# Patient Record
Sex: MALE | Race: AMERICAN INDIAN OR ALASKA NATIVE | NOT HISPANIC OR LATINO | URBAN - METROPOLITAN AREA
[De-identification: names, ages, dates, MRNs, and addresses within clinical notes are randomized per-mention and may not be internally consistent; named-entity substitution may affect disease eponyms.]

---

## 2017-11-27 ENCOUNTER — OUTPATIENT (OUTPATIENT)
Dept: OUTPATIENT SERVICES | Facility: HOSPITAL | Age: 57
LOS: 1 days | Discharge: HOME | End: 2017-11-27
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
## 2017-11-27 DIAGNOSIS — E78.5 HYPERLIPIDEMIA, UNSPECIFIED: ICD-10-CM

## 2017-11-27 DIAGNOSIS — R07.9 CHEST PAIN, UNSPECIFIED: ICD-10-CM

## 2017-11-27 DIAGNOSIS — E11.9 TYPE 2 DIABETES MELLITUS WITHOUT COMPLICATIONS: ICD-10-CM

## 2017-11-30 DIAGNOSIS — K57.30 DIVERTICULOSIS OF LARGE INTESTINE WITHOUT PERFORATION OR ABSCESS WITHOUT BLEEDING: ICD-10-CM

## 2017-11-30 DIAGNOSIS — Z12.11 ENCOUNTER FOR SCREENING FOR MALIGNANT NEOPLASM OF COLON: ICD-10-CM

## 2017-11-30 DIAGNOSIS — E11.9 TYPE 2 DIABETES MELLITUS WITHOUT COMPLICATIONS: ICD-10-CM

## 2017-11-30 DIAGNOSIS — E78.00 PURE HYPERCHOLESTEROLEMIA, UNSPECIFIED: ICD-10-CM

## 2017-11-30 DIAGNOSIS — K22.2 ESOPHAGEAL OBSTRUCTION: ICD-10-CM

## 2017-11-30 DIAGNOSIS — K29.50 UNSPECIFIED CHRONIC GASTRITIS WITHOUT BLEEDING: ICD-10-CM

## 2017-11-30 DIAGNOSIS — I10 ESSENTIAL (PRIMARY) HYPERTENSION: ICD-10-CM

## 2017-11-30 DIAGNOSIS — K44.9 DIAPHRAGMATIC HERNIA WITHOUT OBSTRUCTION OR GANGRENE: ICD-10-CM

## 2017-11-30 DIAGNOSIS — K64.8 OTHER HEMORRHOIDS: ICD-10-CM

## 2018-06-07 ENCOUNTER — EMERGENCY (EMERGENCY)
Facility: HOSPITAL | Age: 58
LOS: 0 days | Discharge: HOME | End: 2018-06-08
Attending: EMERGENCY MEDICINE | Admitting: EMERGENCY MEDICINE

## 2018-06-07 VITALS
RESPIRATION RATE: 18 BRPM | SYSTOLIC BLOOD PRESSURE: 148 MMHG | OXYGEN SATURATION: 97 % | TEMPERATURE: 98 F | HEART RATE: 81 BPM | DIASTOLIC BLOOD PRESSURE: 92 MMHG
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
## 2018-06-07 DIAGNOSIS — Z79.84 LONG TERM (CURRENT) USE OF ORAL HYPOGLYCEMIC DRUGS: ICD-10-CM

## 2018-06-07 DIAGNOSIS — Z79.82 LONG TERM (CURRENT) USE OF ASPIRIN: ICD-10-CM

## 2018-06-07 DIAGNOSIS — K21.9 GASTRO-ESOPHAGEAL REFLUX DISEASE WITHOUT ESOPHAGITIS: ICD-10-CM

## 2018-06-07 DIAGNOSIS — E78.5 HYPERLIPIDEMIA, UNSPECIFIED: ICD-10-CM
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
## 2018-06-07 DIAGNOSIS — R10.9 UNSPECIFIED ABDOMINAL PAIN: ICD-10-CM

## 2018-06-07 DIAGNOSIS — S39.012A STRAIN OF MUSCLE, FASCIA AND TENDON OF LOWER BACK, INITIAL ENCOUNTER: ICD-10-CM
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
## 2018-06-07 DIAGNOSIS — Z79.899 OTHER LONG TERM (CURRENT) DRUG THERAPY: ICD-10-CM

## 2018-06-07 DIAGNOSIS — I10 ESSENTIAL (PRIMARY) HYPERTENSION: ICD-10-CM

## 2018-06-08 RX ORDER — ASPIRIN/CALCIUM CARB/MAGNESIUM 324 MG
1 TABLET ORAL
Qty: 0 | Refills: 0 | COMMUNITY

## 2018-06-08 NOTE — ED PROVIDER NOTE - OBJECTIVE STATEMENT
pt co left low back pain after working in the yard yest. sore. worse with movement of leg/low back. no numbness or weakness. some rad down to thigh.  no ab pain, n, v, fever. no urinary sx.  no incont. no saddle anesth.

## 2018-06-08 NOTE — ED PROVIDER NOTE - PHYSICAL EXAMINATION
VITAL SIGNS: I have reviewed nursing notes and confirm.  CONSTITUTIONAL: Well-developed; well-nourished; in no acute distress.  SKIN: Skin exam is warm and dry, no acute rash.  HEAD: Normocephalic; atraumatic.  EYES: PERRL, EOM intact; conjunctiva and sclera clear.  ENT: No nasal discharge; airway clear.   NECK: Supple; non tender.  CARD:+ S1, S2   RESP: No wheezes, rales or rhonchi.  ABD: Normal bowel sounds; soft; non-distended; non-tender;  BACK: tender over left lumbar spine.   EXT: Normal ROM. No cyanosis or edema.  LYMPH: No acute adenopathy.  NEURO: Alert. Grossly unremarkable. No focal deficits.  PSYCH: Cooperative, appropriate.

## 2018-06-08 NOTE — ED ADULT NURSE NOTE - OBJECTIVE STATEMENT
Pt presents with low back pain that started after doing yardwork. Pt states he was sent by [facility] [place] abnormal [place] ray. Pt denies chest pain, sob, chills, n/v, fever.

## 2018-06-08 NOTE — ED ADULT NURSE NOTE - CHPI ED SYMPTOMS NEG
no difficulty bearing weight/no fatigue/no neck tenderness/no bowel dysfunction/no constipation/no numbness/no bladder dysfunction/no anorexia/no motor function loss/no tingling

## 2020-10-15 ENCOUNTER — INPATIENT (INPATIENT)
Facility: HOSPITAL | Age: 60
LOS: 1 days | Discharge: HOME | End: 2020-10-17
Attending: INTERNAL MEDICINE | Admitting: INTERNAL MEDICINE
Payer: COMMERCIAL

## 2020-10-15 VITALS
WEIGHT: 190.04 LBS | RESPIRATION RATE: 16 BRPM | TEMPERATURE: 99 F | OXYGEN SATURATION: 99 % | SYSTOLIC BLOOD PRESSURE: 167 MMHG | HEIGHT: 68 IN | HEART RATE: 86 BPM | DIASTOLIC BLOOD PRESSURE: 80 MMHG

## 2020-10-15 DIAGNOSIS — Z98.890 OTHER SPECIFIED POSTPROCEDURAL STATES: Chronic | ICD-10-CM

## 2020-10-15 LAB
ALBUMIN SERPL ELPH-MCNC: 4.1 G/DL — SIGNIFICANT CHANGE UP (ref 3.5–5.2)
ALP SERPL-CCNC: 73 U/L — SIGNIFICANT CHANGE UP (ref 30–115)
ALT FLD-CCNC: 19 U/L — SIGNIFICANT CHANGE UP (ref 0–41)
ANION GAP SERPL CALC-SCNC: 10 MMOL/L — SIGNIFICANT CHANGE UP (ref 7–14)
APTT BLD: 34 SEC — SIGNIFICANT CHANGE UP (ref 27–39.2)
AST SERPL-CCNC: 18 U/L — SIGNIFICANT CHANGE UP (ref 0–41)
BILIRUB DIRECT SERPL-MCNC: <0.2 MG/DL — SIGNIFICANT CHANGE UP (ref 0–0.2)
BILIRUB INDIRECT FLD-MCNC: >0.3 MG/DL — SIGNIFICANT CHANGE UP (ref 0.2–1.2)
BILIRUB SERPL-MCNC: 0.5 MG/DL — SIGNIFICANT CHANGE UP (ref 0.2–1.2)
BLD GP AB SCN SERPL QL: SIGNIFICANT CHANGE UP
BUN SERPL-MCNC: 15 MG/DL — SIGNIFICANT CHANGE UP (ref 10–20)
CALCIUM SERPL-MCNC: 9.5 MG/DL — SIGNIFICANT CHANGE UP (ref 8.5–10.1)
CHLORIDE SERPL-SCNC: 100 MMOL/L — SIGNIFICANT CHANGE UP (ref 98–110)
CHOLEST SERPL-MCNC: 157 MG/DL — SIGNIFICANT CHANGE UP (ref 100–200)
CO2 SERPL-SCNC: 27 MMOL/L — SIGNIFICANT CHANGE UP (ref 17–32)
CREAT SERPL-MCNC: 0.9 MG/DL — SIGNIFICANT CHANGE UP (ref 0.7–1.5)
GLUCOSE SERPL-MCNC: 167 MG/DL — HIGH (ref 70–99)
HDLC SERPL-MCNC: 43 MG/DL — SIGNIFICANT CHANGE UP
INR BLD: 1.08 RATIO — SIGNIFICANT CHANGE UP (ref 0.65–1.3)
LACTATE SERPL-SCNC: 1.5 MMOL/L — SIGNIFICANT CHANGE UP (ref 0.7–2)
LIDOCAIN IGE QN: 560 U/L — HIGH (ref 7–60)
LIPID PNL WITH DIRECT LDL SERPL: 89 MG/DL — SIGNIFICANT CHANGE UP (ref 4–129)
POTASSIUM SERPL-MCNC: 5.1 MMOL/L — HIGH (ref 3.5–5)
POTASSIUM SERPL-SCNC: 5.1 MMOL/L — HIGH (ref 3.5–5)
PROT SERPL-MCNC: 6.8 G/DL — SIGNIFICANT CHANGE UP (ref 6–8)
PROTHROM AB SERPL-ACNC: 12.4 SEC — SIGNIFICANT CHANGE UP (ref 9.95–12.87)
RAPID RVP RESULT: SIGNIFICANT CHANGE UP
SARS-COV-2 RNA SPEC QL NAA+PROBE: SIGNIFICANT CHANGE UP
SODIUM SERPL-SCNC: 137 MMOL/L — SIGNIFICANT CHANGE UP (ref 135–146)
TOTAL CHOLESTEROL/HDL RATIO MEASUREMENT: 3.7 RATIO — LOW (ref 4–5.5)
TRIGL SERPL-MCNC: 140 MG/DL — SIGNIFICANT CHANGE UP (ref 10–149)

## 2020-10-15 PROCEDURE — 99285 EMERGENCY DEPT VISIT HI MDM: CPT

## 2020-10-15 PROCEDURE — 74177 CT ABD & PELVIS W/CONTRAST: CPT | Mod: 26

## 2020-10-15 PROCEDURE — 99222 1ST HOSP IP/OBS MODERATE 55: CPT

## 2020-10-15 RX ORDER — SODIUM CHLORIDE 9 MG/ML
1000 INJECTION, SOLUTION INTRAVENOUS ONCE
Refills: 0 | Status: COMPLETED | OUTPATIENT
Start: 2020-10-15 | End: 2020-10-15

## 2020-10-15 RX ORDER — FAMOTIDINE 10 MG/ML
20 INJECTION INTRAVENOUS ONCE
Refills: 0 | Status: COMPLETED | OUTPATIENT
Start: 2020-10-15 | End: 2020-10-15

## 2020-10-15 RX ORDER — ACETAMINOPHEN 500 MG
650 TABLET ORAL ONCE
Refills: 0 | Status: COMPLETED | OUTPATIENT
Start: 2020-10-15 | End: 2020-10-15

## 2020-10-15 RX ADMIN — Medication 650 MILLIGRAM(S): at 18:16

## 2020-10-15 RX ADMIN — SODIUM CHLORIDE 1000 MILLILITER(S): 9 INJECTION, SOLUTION INTRAVENOUS at 20:18

## 2020-10-15 RX ADMIN — FAMOTIDINE 20 MILLIGRAM(S): 10 INJECTION INTRAVENOUS at 18:58

## 2020-10-15 RX ADMIN — SODIUM CHLORIDE 1000 MILLILITER(S): 9 INJECTION, SOLUTION INTRAVENOUS at 18:16

## 2020-10-15 NOTE — ED ADULT TRIAGE NOTE - CHIEF COMPLAINT QUOTE
pt states he has had left abdominal pain for 5 days with 2 dark tarry stools today. pt states it hurts more after eating or breathing in.

## 2020-10-15 NOTE — H&P ADULT - NSICDXPASTMEDICALHX_GEN_ALL_CORE_FT
PAST MEDICAL HISTORY:  DM (diabetes mellitus)     GERD (gastroesophageal reflux disease)     HLD (hyperlipidemia)     HTN (hypertension)

## 2020-10-15 NOTE — ED PROVIDER NOTE - CLINICAL SUMMARY MEDICAL DECISION MAKING FREE TEXT BOX
60 male here for evaluation of abdominal pain. Had screening labs imaging medications and reevaluation, plan is for inpatient admission for continued management for pancreatitis of unknown source.

## 2020-10-15 NOTE — ED PROVIDER NOTE - PHYSICAL EXAMINATION
CONSTITUTIONAL: Well-developed; well-nourished; in no acute distress.   SKIN: warm, dry  HEAD: Normocephalic; atraumatic.  EYES: no conjunctival injection. PERRLA. EOMI.   ENT: No nasal discharge; airway clear.  NECK: Supple; non tender.  CARD: S1, S2 normal; no murmurs, gallops, or rubs. Regular rate and rhythm.   RESP: No wheezes, rales or rhonchi.  ABD: soft nondistended. +LLQ TTP. no rebound or guarding.   EXT: Normal ROM.  No LE edema.   LYMPH: No acute cervical adenopathy.  NEURO: Alert, oriented, grossly unremarkable.  PSYCH: Cooperative, appropriate. CONSTITUTIONAL: Well-developed; well-nourished; in no acute distress.   SKIN: warm, dry  HEAD: Normocephalic; atraumatic.  EYES: no conjunctival injection. PERRLA. EOMI.   ENT: No nasal discharge; airway clear.  NECK: Supple; non tender.  CARD: S1, S2 normal; no murmurs, gallops, or rubs. Regular rate and rhythm.   RESP: No wheezes, rales or rhonchi.  ABD: soft nondistended. +LLQ TTP. no rebound or guarding.   RECTAL: Nontender, no masses or fissures, no blood. chaperoned by ADY Dawson.    EXT: Normal ROM.  No LE edema.   LYMPH: No acute cervical adenopathy.  NEURO: Alert, oriented, grossly unremarkable.  PSYCH: Cooperative, appropriate.

## 2020-10-15 NOTE — ED ADULT NURSE NOTE - PMH
DM (diabetes mellitus)    GERD (gastroesophageal reflux disease)    HLD (hyperlipidemia)    HTN (hypertension)

## 2020-10-15 NOTE — ED PROVIDER NOTE - OBJECTIVE STATEMENT
61 y/o M PMHx DM, GERD, HLD, HTN, CAD s/p CABG on aspirin and plavix presents to ED with intermittent moderate LLQ pain x4 days. Pt reports one dark black bowel movement today prompting ED visit. No fevers or chills. No urinary sx. Pt had normal colonoscopy 3 years ago. Denies vomiting or nausea.

## 2020-10-15 NOTE — ED PROVIDER NOTE - NS ED ROS FT
Review of Systems:  CONSTITUTIONAL: No fever, No diaphoresis, No weight change  SKIN: No rash  HEMATOLOGIC: No abnormal bleeding or bruising  EYES: No eye pain, No blurred vision  ENT: No sore throat, No neck pain, No rhinorrhea  RESPIRATORY: No shortness of breath, No cough  CARDIAC: No chest pain, No palpitations  GI: +abdominal pain, No nausea, No vomiting, No diarrhea, No constipation, No bright red blood per rectum. +dark stools. No flank pain  : No dysuria, frequency, hematuria.   MUSCULOSKELETAL: No joint paint, No swelling, No back pain  NEUROLOGIC: No numbness, No focal weakness, No headache, No dizziness  All other systems negative, unless specified in HPI

## 2020-10-15 NOTE — H&P ADULT - HISTORY OF PRESENT ILLNESS
[age] male with multiple medical conditions including heart disease and diabetes, presents to the ER due to abdominal pains which are worse with eating. 59yo male with multiple medical conditions including heart disease and diabetes, presents to the ER due to abdominal pains which are worse with eating. Location is left upper abdomen

## 2020-10-15 NOTE — H&P ADULT - NSHPLABSRESULTS_GEN_ALL_CORE
< from: CT Abdomen and Pelvis w/ IV Cont (10.15.20 @ 19:42) >      EXAM:  CT ABDOMEN AND PELVIS IC            PROCEDURE DATE:  10/15/2020      < from: CT Abdomen and Pelvis w/ IV Cont (10.15.20 @ 19:42) >      IMPRESSION:    1. No evidence of diverticulitis.    2. Small nonspecific (1 cm or less) lymph nodes are noted within the mesentery.    3. A 7 mm ill-defined nodule is noted in the left posterior costophrenic sulcus (2/15)(4/57)    [employer] RECOMMENDATIONS FOR FOLLOW-UP OF SMALL LUNG NODULES DETECTED INCIDENTALLY ON CT (PATIENTS ? 35 YEARS OF AGE)  NODULE SIZE >6 <8 mm    LOW-RISK PATIENT ? Initial CT at 6-12 months ? If stable, repeat CT at 18-24 months  HIGH-RISK PATIENT ? Initial CT at 3-6 months ? If stable, repeat CT at 9-12 months and 24 months      MARISELA DIAS M.D., ATTENDING RADIOLOGIST  This document has been electronically signed. Oct 15 2020  8:41PM    < end of copied text >                          13.2   8.24  )-----------( 233      ( 15 Oct 2020 18:48 )             38.9     10-15    137  |  100  |  15  ----------------------------<  167<H>  5.1<H>   |  27  |  0.9    Ca    9.5      15 Oct 2020 18:15    TPro  6.8  /  Alb  4.1  /  TBili  0.5  /  DBili  <0.2  /  AST  18  /  ALT  19  /  AlkPhos  73  10-15            PT/INR - ( 15 Oct 2020 18:15 )   PT: 12.40 sec;   INR: 1.08 ratio         PTT - ( 15 Oct 2020 18:15 )  PTT:34.0 sec  Lactate Trend  10-15 @ 18:15 Lactate:1.5         CAPILLARY BLOOD GLUCOSE      POCT Blood Glucose.: 174 mg/dL (16 Oct 2020 02:51)                          13.2   8.24  )-----------( 233      ( 15 Oct 2020 18:48 )             38.9     10-15    137  |  100  |  15  ----------------------------<  167<H>  5.1<H>   |  27  |  0.9    Ca    9.5      15 Oct 2020 18:15    TPro  6.8  /  Alb  4.1  /  TBili  0.5  /  DBili  <0.2  /  AST  18  /  ALT  19  /  AlkPhos  73  10-15            PT/INR - ( 15 Oct 2020 18:15 )   PT: 12.40 sec;   INR: 1.08 ratio         PTT - ( 15 Oct 2020 18:15 )  PTT:34.0 sec  Lactate Trend  10-15 @ 18:15 Lactate:1.5     Lipase-    CAPILLARY BLOOD GLUCOSE      POCT Blood Glucose.: 174 mg/dL (16 Oct 2020 02:51)

## 2020-10-15 NOTE — ED PROVIDER NOTE - ATTENDING CONTRIBUTION TO CARE
I personally evaluated the patient. I reviewed the Resident’s or Physician Assistant’s note (as assigned above), and agree with the findings and plan except as documented in my note.     60 male here for evaluation of abdominal pain left sided with dark stool today. LLQ pain is non radiating, for multiple days, without constitutional symptoms.     ROS otherwise unremarkable    PE: female in no distress. CV: pulses intact. CHEST: normal work of breathing. ABD: nondistended. left sided tenderness to palpation noted no rebound no guarding. SKIN: normal. EXT: FROM. NEURO: AAO 3 no focal deficits. HEENT: mucosa normal nonicteric    Impression: abdominal pain     Plan: IV labs imaging supportive care and reevaluation I personally evaluated the patient. I reviewed the Resident’s or Physician Assistant’s note (as assigned above), and agree with the findings and plan except as documented in my note.     60 male here for evaluation of abdominal pain left sided with dark stool today. LLQ pain is non radiating, for multiple days, without constitutional symptoms.     ROS otherwise unremarkable    PE: male in no distress. CV: pulses intact. CHEST: normal work of breathing. ABD: nondistended. left sided tenderness to palpation noted no rebound no guarding. SKIN: normal. EXT: FROM. NEURO: AAO 3 no focal deficits. HEENT: mucosa normal nonicteric    Impression: abdominal pain     Plan: IV labs imaging supportive care and reevaluation

## 2020-10-16 DIAGNOSIS — E78.5 HYPERLIPIDEMIA, UNSPECIFIED: ICD-10-CM

## 2020-10-16 DIAGNOSIS — E11.9 TYPE 2 DIABETES MELLITUS WITHOUT COMPLICATIONS: ICD-10-CM

## 2020-10-16 DIAGNOSIS — K85.90 ACUTE PANCREATITIS WITHOUT NECROSIS OR INFECTION, UNSPECIFIED: ICD-10-CM

## 2020-10-16 DIAGNOSIS — I10 ESSENTIAL (PRIMARY) HYPERTENSION: ICD-10-CM

## 2020-10-16 DIAGNOSIS — K21.9 GASTRO-ESOPHAGEAL REFLUX DISEASE WITHOUT ESOPHAGITIS: ICD-10-CM

## 2020-10-16 DIAGNOSIS — R91.1 SOLITARY PULMONARY NODULE: ICD-10-CM

## 2020-10-16 LAB
ALBUMIN SERPL ELPH-MCNC: 3.7 G/DL — SIGNIFICANT CHANGE UP (ref 3.5–5.2)
ALP SERPL-CCNC: 69 U/L — SIGNIFICANT CHANGE UP (ref 30–115)
ALT FLD-CCNC: 16 U/L — SIGNIFICANT CHANGE UP (ref 0–41)
ANION GAP SERPL CALC-SCNC: 10 MMOL/L — SIGNIFICANT CHANGE UP (ref 7–14)
AST SERPL-CCNC: 15 U/L — SIGNIFICANT CHANGE UP (ref 0–41)
BILIRUB SERPL-MCNC: 0.7 MG/DL — SIGNIFICANT CHANGE UP (ref 0.2–1.2)
BUN SERPL-MCNC: 9 MG/DL — LOW (ref 10–20)
CALCIUM SERPL-MCNC: 9.1 MG/DL — SIGNIFICANT CHANGE UP (ref 8.5–10.1)
CHLORIDE SERPL-SCNC: 101 MMOL/L — SIGNIFICANT CHANGE UP (ref 98–110)
CO2 SERPL-SCNC: 27 MMOL/L — SIGNIFICANT CHANGE UP (ref 17–32)
CREAT SERPL-MCNC: 0.7 MG/DL — SIGNIFICANT CHANGE UP (ref 0.7–1.5)
GLUCOSE BLDC GLUCOMTR-MCNC: 154 MG/DL — HIGH (ref 70–99)
GLUCOSE BLDC GLUCOMTR-MCNC: 155 MG/DL — HIGH (ref 70–99)
GLUCOSE BLDC GLUCOMTR-MCNC: 174 MG/DL — HIGH (ref 70–99)
GLUCOSE BLDC GLUCOMTR-MCNC: 183 MG/DL — HIGH (ref 70–99)
GLUCOSE BLDC GLUCOMTR-MCNC: 251 MG/DL — HIGH (ref 70–99)
GLUCOSE SERPL-MCNC: 202 MG/DL — HIGH (ref 70–99)
HCT VFR BLD CALC: 40.3 % — LOW (ref 42–52)
HCV AB S/CO SERPL IA: 0.04 COI — SIGNIFICANT CHANGE UP
HCV AB SERPL-IMP: SIGNIFICANT CHANGE UP
HGB BLD-MCNC: 14 G/DL — SIGNIFICANT CHANGE UP (ref 14–18)
LIDOCAIN IGE QN: 259 U/L — HIGH (ref 7–60)
MAGNESIUM SERPL-MCNC: 1.6 MG/DL — LOW (ref 1.8–2.4)
MCHC RBC-ENTMCNC: 29.6 PG — SIGNIFICANT CHANGE UP (ref 27–31)
MCHC RBC-ENTMCNC: 34.7 G/DL — SIGNIFICANT CHANGE UP (ref 32–37)
MCV RBC AUTO: 85.2 FL — SIGNIFICANT CHANGE UP (ref 80–94)
NRBC # BLD: 0 /100 WBCS — SIGNIFICANT CHANGE UP (ref 0–0)
PHOSPHATE SERPL-MCNC: 3.4 MG/DL — SIGNIFICANT CHANGE UP (ref 2.1–4.9)
PLATELET # BLD AUTO: 232 K/UL — SIGNIFICANT CHANGE UP (ref 130–400)
POTASSIUM SERPL-MCNC: 4.3 MMOL/L — SIGNIFICANT CHANGE UP (ref 3.5–5)
POTASSIUM SERPL-SCNC: 4.3 MMOL/L — SIGNIFICANT CHANGE UP (ref 3.5–5)
PROT SERPL-MCNC: 6.2 G/DL — SIGNIFICANT CHANGE UP (ref 6–8)
RBC # BLD: 4.73 M/UL — SIGNIFICANT CHANGE UP (ref 4.7–6.1)
RBC # FLD: 11.9 % — SIGNIFICANT CHANGE UP (ref 11.5–14.5)
SODIUM SERPL-SCNC: 138 MMOL/L — SIGNIFICANT CHANGE UP (ref 135–146)
WBC # BLD: 7.02 K/UL — SIGNIFICANT CHANGE UP (ref 4.8–10.8)
WBC # FLD AUTO: 7.02 K/UL — SIGNIFICANT CHANGE UP (ref 4.8–10.8)

## 2020-10-16 PROCEDURE — 99233 SBSQ HOSP IP/OBS HIGH 50: CPT

## 2020-10-16 PROCEDURE — 99222 1ST HOSP IP/OBS MODERATE 55: CPT

## 2020-10-16 RX ORDER — DEXTROSE 50 % IN WATER 50 %
12.5 SYRINGE (ML) INTRAVENOUS ONCE
Refills: 0 | Status: DISCONTINUED | OUTPATIENT
Start: 2020-10-16 | End: 2020-10-17

## 2020-10-16 RX ORDER — INSULIN LISPRO 100/ML
VIAL (ML) SUBCUTANEOUS
Refills: 0 | Status: DISCONTINUED | OUTPATIENT
Start: 2020-10-16 | End: 2020-10-17

## 2020-10-16 RX ORDER — SODIUM CHLORIDE 9 MG/ML
1000 INJECTION INTRAMUSCULAR; INTRAVENOUS; SUBCUTANEOUS
Refills: 0 | Status: DISCONTINUED | OUTPATIENT
Start: 2020-10-16 | End: 2020-10-16

## 2020-10-16 RX ORDER — DEXTROSE 50 % IN WATER 50 %
15 SYRINGE (ML) INTRAVENOUS ONCE
Refills: 0 | Status: DISCONTINUED | OUTPATIENT
Start: 2020-10-16 | End: 2020-10-17

## 2020-10-16 RX ORDER — GLUCAGON INJECTION, SOLUTION 0.5 MG/.1ML
1 INJECTION, SOLUTION SUBCUTANEOUS ONCE
Refills: 0 | Status: DISCONTINUED | OUTPATIENT
Start: 2020-10-16 | End: 2020-10-17

## 2020-10-16 RX ORDER — INSULIN LISPRO 100/ML
4 VIAL (ML) SUBCUTANEOUS ONCE
Refills: 0 | Status: COMPLETED | OUTPATIENT
Start: 2020-10-16 | End: 2020-10-16

## 2020-10-16 RX ORDER — LOSARTAN POTASSIUM 100 MG/1
25 TABLET, FILM COATED ORAL DAILY
Refills: 0 | Status: DISCONTINUED | OUTPATIENT
Start: 2020-10-16 | End: 2020-10-17

## 2020-10-16 RX ORDER — ASPIRIN/CALCIUM CARB/MAGNESIUM 324 MG
81 TABLET ORAL DAILY
Refills: 0 | Status: DISCONTINUED | OUTPATIENT
Start: 2020-10-16 | End: 2020-10-17

## 2020-10-16 RX ORDER — DEXTROSE 50 % IN WATER 50 %
25 SYRINGE (ML) INTRAVENOUS ONCE
Refills: 0 | Status: DISCONTINUED | OUTPATIENT
Start: 2020-10-16 | End: 2020-10-17

## 2020-10-16 RX ORDER — SODIUM CHLORIDE 9 MG/ML
1000 INJECTION, SOLUTION INTRAVENOUS
Refills: 0 | Status: DISCONTINUED | OUTPATIENT
Start: 2020-10-16 | End: 2020-10-17

## 2020-10-16 RX ORDER — SODIUM CHLORIDE 9 MG/ML
1000 INJECTION INTRAMUSCULAR; INTRAVENOUS; SUBCUTANEOUS
Refills: 0 | Status: DISCONTINUED | OUTPATIENT
Start: 2020-10-16 | End: 2020-10-17

## 2020-10-16 RX ORDER — ATORVASTATIN CALCIUM 80 MG/1
10 TABLET, FILM COATED ORAL AT BEDTIME
Refills: 0 | Status: DISCONTINUED | OUTPATIENT
Start: 2020-10-16 | End: 2020-10-17

## 2020-10-16 RX ORDER — PANTOPRAZOLE SODIUM 20 MG/1
40 TABLET, DELAYED RELEASE ORAL
Refills: 0 | Status: DISCONTINUED | OUTPATIENT
Start: 2020-10-16 | End: 2020-10-17

## 2020-10-16 RX ORDER — ACETAMINOPHEN 500 MG
650 TABLET ORAL EVERY 6 HOURS
Refills: 0 | Status: DISCONTINUED | OUTPATIENT
Start: 2020-10-16 | End: 2020-10-17

## 2020-10-16 RX ORDER — IBUPROFEN 200 MG
400 TABLET ORAL EVERY 6 HOURS
Refills: 0 | Status: DISCONTINUED | OUTPATIENT
Start: 2020-10-16 | End: 2020-10-17

## 2020-10-16 RX ORDER — MAGNESIUM SULFATE 500 MG/ML
2 VIAL (ML) INJECTION ONCE
Refills: 0 | Status: COMPLETED | OUTPATIENT
Start: 2020-10-16 | End: 2020-10-16

## 2020-10-16 RX ADMIN — Medication 1: at 17:08

## 2020-10-16 RX ADMIN — PANTOPRAZOLE SODIUM 40 MILLIGRAM(S): 20 TABLET, DELAYED RELEASE ORAL at 05:44

## 2020-10-16 RX ADMIN — Medication 650 MILLIGRAM(S): at 05:43

## 2020-10-16 RX ADMIN — LOSARTAN POTASSIUM 25 MILLIGRAM(S): 100 TABLET, FILM COATED ORAL at 05:44

## 2020-10-16 RX ADMIN — SODIUM CHLORIDE 250 MILLILITER(S): 9 INJECTION INTRAMUSCULAR; INTRAVENOUS; SUBCUTANEOUS at 04:29

## 2020-10-16 RX ADMIN — Medication 4 UNIT(S): at 12:07

## 2020-10-16 RX ADMIN — ATORVASTATIN CALCIUM 10 MILLIGRAM(S): 80 TABLET, FILM COATED ORAL at 22:06

## 2020-10-16 RX ADMIN — SODIUM CHLORIDE 250 MILLILITER(S): 9 INJECTION INTRAMUSCULAR; INTRAVENOUS; SUBCUTANEOUS at 11:29

## 2020-10-16 RX ADMIN — Medication 81 MILLIGRAM(S): at 11:31

## 2020-10-16 RX ADMIN — Medication 650 MILLIGRAM(S): at 06:24

## 2020-10-16 RX ADMIN — Medication 50 GRAM(S): at 17:08

## 2020-10-16 RX ADMIN — SODIUM CHLORIDE 250 MILLILITER(S): 9 INJECTION INTRAMUSCULAR; INTRAVENOUS; SUBCUTANEOUS at 22:07

## 2020-10-16 RX ADMIN — SODIUM CHLORIDE 250 MILLILITER(S): 9 INJECTION INTRAMUSCULAR; INTRAVENOUS; SUBCUTANEOUS at 15:07

## 2020-10-16 NOTE — CONSULT NOTE ADULT - SUBJECTIVE AND OBJECTIVE BOX
Gastroenterology Consultation:    Patient is a 60y old  Male who presents with a chief complaint of pancreatitis (16 Oct 2020 15:29)      Admitted on: 10-15-20  HPI:  61yo male with multiple medical conditions including heart disease and diabetes, presents to the ER due to abdominal pains which are worse with eating. Location is left upper abdomen (15 Oct 2020 23:48)    GI HPI  60 year old male with CAD and DM presents with moderate LLQ abdominal pain that is worse with eating. The pain is intermittent. No prior similar symptoms. Today he states that he is somewhat improved but he attributes this to a limited, clear liquid diet. No diarrhea. He denies nausea or vomiting.     Prior records Reviewed (Y/N): Y  History obtained from person other than patient (Y/N): Y Son      Prior Colonoscopy: 4years ago. Results unknown      PAST MEDICAL & SURGICAL HISTORY:  GERD (gastroesophageal reflux disease)    DM (diabetes mellitus)    HTN (hypertension)    HLD (hyperlipidemia)    H/O heart surgery        FAMILY HISTORY:  Noncontributory    Social History:  no IVDU      Home Medications:  aspirin 81 mg oral tablet: 1 tab(s) orally once a day (08 Jun 2018 03:31)  atorvastatin:  (08 Jun 2018 03:31)  glimepiride:  (08 Jun 2018 03:31)  losartan:  (08 Jun 2018 03:31)  metFORMIN:  (08 Jun 2018 03:31)  omeprazole:  (08 Jun 2018 03:31)    MEDICATIONS  (STANDING):  aspirin enteric coated 81 milliGRAM(s) Oral daily  atorvastatin 10 milliGRAM(s) Oral at bedtime  dextrose 5%. 1000 milliLiter(s) (50 mL/Hr) IV Continuous <Continuous>  dextrose 50% Injectable 12.5 Gram(s) IV Push once  dextrose 50% Injectable 25 Gram(s) IV Push once  insulin lispro (HumaLOG) corrective regimen sliding scale   SubCutaneous three times a day before meals  losartan 25 milliGRAM(s) Oral daily  pantoprazole    Tablet 40 milliGRAM(s) Oral before breakfast  sodium chloride 0.9%. 1000 milliLiter(s) (250 mL/Hr) IV Continuous <Continuous>  sodium chloride 0.9%. 1000 milliLiter(s) (250 mL/Hr) IV Continuous <Continuous>    MEDICATIONS  (PRN):  acetaminophen   Tablet .. 650 milliGRAM(s) Oral every 6 hours PRN Mild Pain (1 - 3)  dextrose 40% Gel 15 Gram(s) Oral once PRN Blood Glucose LESS THAN 70 milliGRAM(s)/deciliter  glucagon  Injectable 1 milliGRAM(s) IntraMuscular once PRN Glucose LESS THAN 70 milligrams/deciliter  ibuprofen  Tablet. 400 milliGRAM(s) Oral every 6 hours PRN Moderate Pain (4 - 6)      Allergies  No Known Allergies      Review of Systems:   Constitutional:  No Fever, No Chills  ENT/Mouth:  No Hearing Changes,  No Difficulty Swallowing  Eyes:  No Eye Pain, No Vision Changes  Cardiovascular:  No Chest Pain, No Palpitations  Respiratory:  No Cough, No Dyspnea  Gastrointestinal:  As described in HPI  Musculoskeletal:  No Joint Swelling, No Back Pain  Skin:  No Skin Lesions, No Jaundice  Neuro:  No Syncope, No Dizziness  Heme/Lymph:  No Bruising, No Bleeding.          Physical Examination:  T(C): 36.8 (10-16-20 @ 13:00), Max: 37.4 (10-15-20 @ 17:01)  HR: 63 (10-16-20 @ 13:00) (63 - 86)  BP: 175/84 (10-16-20 @ 13:00) (152/72 - 175/84)  RR: 16 (10-16-20 @ 13:00) (16 - 17)  SpO2: 99% (10-15-20 @ 23:36) (99% - 99%)  Height (cm): 165.1 (10-16-20 @ 02:50)  Weight (kg): 91.8 (10-16-20 @ 02:50)      Constitutional: No acute distress.  Eyes:. Conjunctivae are clear, Sclera is non-icteric.  Ears Nose and Throat: The external ears are normal appearing,  Oral mucosa is pink and moist.  Respiratory:  No signs of respiratory distress. Lung sounds are clear bilaterally.  Cardiovascular:  S1 S2, Regular rate and rhythm.  GI: Abdomen is soft, symmetric, and non-tender without distention. There are no visible lesions. Bowel sounds are present and normoactive in all four quadrants. No masses, hepatomegaly, or splenomegaly are noted.   Neuro: No Tremor, No involuntary movements  Skin: No rashes, No Jaundice.          Data: (reviewed by attending)                        14.0   7.02  )-----------( 232      ( 16 Oct 2020 06:55 )             40.3     Hgb Trend:  14.0  10-16-20 @ 06:55  13.2  10-15-20 @ 18:48      10-16    138  |  101  |  9<L>  ----------------------------<  202<H>  4.3   |  27  |  0.7    Ca    9.1      16 Oct 2020 06:55  Phos  3.4     10-16  Mg     1.6     10-16    TPro  6.2  /  Alb  3.7  /  TBili  0.7  /  DBili  x   /  AST  15  /  ALT  16  /  AlkPhos  69  10-16    Liver panel trend:  TBili 0.7   /   AST 15   /   ALT 16   /   AlkP 69   /   Tptn 6.2   /   Alb 3.7    /   DBili --      10-16  TBili 0.5   /   AST 18   /   ALT 19   /   AlkP 73   /   Tptn 6.8   /   Alb 4.1    /   DBili <0.2      10-15      PT/INR - ( 15 Oct 2020 18:15 )   PT: 12.40 sec;   INR: 1.08 ratio         PTT - ( 15 Oct 2020 18:15 )  PTT:34.0 sec        Radiology:(reviewed by attending)  CT Abdomen and Pelvis w/ IV Cont:   EXAM:  CT ABDOMEN AND PELVIS IC            PROCEDURE DATE:  10/15/2020            INTERPRETATION:  REASON FOR EXAM / CLINICAL STATEMENT: LLQ abdominal pain  WBC 8.24    TECHNIQUE: Contiguous axial CT images were obtained from the lower chest to thepubic symphysis with intravenous contrast.   Reformatted images in the coronal and sagittal planes were acquired.    COMPARISON CT: CT scan of the abdomen and pelvis dated 6/8/2018    OTHER STUDIES USED FOR CORRELATION: None.        FINDINGS    LOWERCHEST: A 7 mm ill-defined nodule is noted in the left posterior costophrenic sulcus (2/15). Status post CABG. No pleural or pericardial effusion.    HEPATIC: The liver is normal in appearance with no evidence of mass or bile duct dilatation. The portal vein is patent. The hepatic veins are opacified.    BILIARY: No calcified gallstones are noted.    SPLEEN: Unremarkable.    PANCREAS: The pancreas is normal in size and configuration. No evidence of mass or pancreatitis.    ADRENAL GLANDS: Unremarkable.    KIDNEYS: There is symmetric bilateral renal enhancement. No evidence of hydronephrosis, calcified stones, or solid mass.    ABDOMINOPELVIC NODES: Small nonspecific (1 cm or less) lymph nodes are noted within the mesentery.    PELVIC ORGANS: No evidence of pelvic mass, lymphadenopathy, or fluid collection.    PERITONEUM/MESENTERY/BOWEL: Chronic diverticula are noted however there is no evidence of diverticulitis. No evidence of bowel obstruction, colitis, inflammatory process, or ascites within the abdomen or pelvis. No pneumoperitoneum. The appendix is normal in appearance. A fat-containing nonobstructing umbilical hernia is noted.    BONES/SOFT TISSUES: Degenerative changes of the spine are noted.    OTHER: No evidence of abdominal aortic aneurysm.      IMPRESSION:    1. No evidence of diverticulitis.    2. Small nonspecific (1 cm or less) lymph nodes are noted within the mesentery.    3. A 7 mm ill-defined nodule is noted in the left posterior costophrenic sulcus (2/15)(4/57)    [employer] RECOMMENDATIONS FOR FOLLOW-UP OF SMALL LUNG NODULES DETECTED INCIDENTALLY ON CT (PATIENTS ? 35 YEARS OF AGE)  NODULE SIZE >6 <8 mm    LOW-RISK PATIENT ? Initial CT at 6-12 months ? If stable, repeat CT at 18-24 months  HIGH-RISK PATIENT ? Initial CT at 3-6 months ? If stable, repeat CT at 9-12 months and 24 months                  MARISELA DIAS M.D., ATTENDING RADIOLOGIST  This document has been electronically signed. Oct 15 2020  8:41PM (10-15-20 @ 19:42)

## 2020-10-16 NOTE — PROGRESS NOTE ADULT - SUBJECTIVE AND OBJECTIVE BOX
ONEIL THORNTON  60y  Male      Patient is a 60y old  Male who presents with a chief complaint of pancreatitis (15 Oct 2020 23:48)      INTERVAL HPI/OVERNIGHT EVENTS:  pt seen and examined at bedside this morning  -will continue with IVF, advance diet as tolerated; and continue with inpatient monitoring     REVIEW OF SYSTEMS:  abdominal pain midepigastric and mild nausea with clear liquid diet     Vital Signs Last 24 Hrs  T(C): 36.8 (16 Oct 2020 13:00), Max: 37.4 (15 Oct 2020 17:01)  T(F): 98.3 (16 Oct 2020 13:00), Max: 99.4 (15 Oct 2020 17:01)  HR: 63 (16 Oct 2020 13:00) (63 - 86)  BP: 175/84 (16 Oct 2020 13:00) (152/72 - 175/84)  RR: 16 (16 Oct 2020 13:00) (16 - 17)  SpO2: 99% (15 Oct 2020 23:36) (99% - 99%)    PHYSICAL EXAM:  GENERAL: NAD, well-groomed, well-developed  HEAD:  Atraumatic, Normocephalic  EYES: EOMI, PERRLA, conjunctiva and sclera clear  NERVOUS SYSTEM:  Alert & Oriented X 4, Good concentration; Motor Strength 5/5 B/L upper and lower extremities; DTRs 2+ intact and symmetric  CHEST/LUNG: Clear to percussion bilaterally; No rales, rhonchi, wheezing, or rubs  CV/HEART: Regular rate and rhythm; No murmurs, rubs, or gallops  GI/ABDOMEN: Soft, Nontender, mild epigastric pain on deep palpation,  Bowel sounds present  EXTREMITIES:  2+ Peripheral Pulses, No clubbing, cyanosis, or edema  SKIN: No rashes or lesions    LAB:                        14.0   7.02  )-----------( 232      ( 16 Oct 2020 06:55 )             40.3     10-16    138  |  101  |  9<L>  ----------------------------<  202<H>  4.3   |  27  |  0.7    Ca    9.1      16 Oct 2020 06:55  Phos  3.4     10-16  Mg     1.6     10-16    TPro  6.2  /  Alb  3.7  /  TBili  0.7  /  DBili  x   /  AST  15  /  ALT  16  /  AlkPhos  69  10-16      Daily Height in cm: 165.1 (16 Oct 2020 02:50)    Daily   CAPILLARY BLOOD GLUCOSE      POCT Blood Glucose.: 251 mg/dL (16 Oct 2020 11:24)  POCT Blood Glucose.: 183 mg/dL (16 Oct 2020 07:38)  POCT Blood Glucose.: 174 mg/dL (16 Oct 2020 02:51)      LIVER FUNCTIONS - ( 16 Oct 2020 06:55 )  Alb: 3.7 g/dL / Pro: 6.2 g/dL / ALK PHOS: 69 U/L / ALT: 16 U/L / AST: 15 U/L / GGT: x           RADIOLOGY:    Imaging Personally Reviewed:  [Y ] YES  [ ] NO    HEALTH ISSUES - PROBLEM Dx:  Lung nodule  Lung nodule    GERD (gastroesophageal reflux disease)  GERD (gastroesophageal reflux disease)    HLD (hyperlipidemia)  HLD (hyperlipidemia)    HTN (hypertension)  HTN (hypertension)    Type 2 diabetes mellitus without complication, without long-term current use of insulin  Type 2 diabetes mellitus without complication, without long-term current use of insulin    Pancreatitis  Pancreatitis    MEDS:  acetaminophen   Tablet .. 650 milliGRAM(s) Oral every 6 hours PRN  aspirin enteric coated 81 milliGRAM(s) Oral daily  atorvastatin 10 milliGRAM(s) Oral at bedtime  dextrose 40% Gel 15 Gram(s) Oral once PRN  dextrose 5%. 1000 milliLiter(s) IV Continuous <Continuous>  dextrose 50% Injectable 12.5 Gram(s) IV Push once  dextrose 50% Injectable 25 Gram(s) IV Push once  glucagon  Injectable 1 milliGRAM(s) IntraMuscular once PRN  ibuprofen  Tablet. 400 milliGRAM(s) Oral every 6 hours PRN  insulin lispro (HumaLOG) corrective regimen sliding scale   SubCutaneous three times a day before meals  losartan 25 milliGRAM(s) Oral daily  pantoprazole    Tablet 40 milliGRAM(s) Oral before breakfast  sodium chloride 0.9%. 1000 milliLiter(s) IV Continuous <Continuous>  sodium chloride 0.9%. 1000 milliLiter(s) IV Continuous <Continuous>

## 2020-10-16 NOTE — CONSULT NOTE ADULT - ASSESSMENT
60 year old male with abdominal pain (LLQ, postprandial) elevated Lipase.     Given that CT was unrevealing and abdominal pain is not typical, doubt acute pancreatitis  Lactate 1.5    - Advance diet as tolerated  - abdominal ultrasound  - Miralax bid  - senna 2 pills nightly  - If pain fails to improve, consider CT angiography

## 2020-10-16 NOTE — PROGRESS NOTE ADULT - ASSESSMENT
Assessment and Plan: patient with abdominal pain     1)  Acute Pancreatitis.    -diet as tolerated  -pain control prn  -GI eval   -IVF    2) Type 2 diabetes mellitus   -with hyperglycemia  -added insulin this morning with parameters     3) HTN (hypertension).  Plan: monitor on current meds.     4) hyperlipidemia.  Plan: Lipitor     5) GERD (gastroesophageal reflux disease).  Plan: PPI.     6) Abnormal imaging/Lung nodule.   -outpatient follow up with PMD, with repeat imaging     7) suspected Mg2+ def  -replete and check levels     # Progress Note Handoff  PENDING as follows  consults: GI consult   Test: daily labs   Other:  Family discussion: discussed with patient who comprehends his medical care   Disposition: not ready yet     Attending Physician Dr. Melanie Vicente # [id number]

## 2020-10-17 ENCOUNTER — TRANSCRIPTION ENCOUNTER (OUTPATIENT)
Age: 60
End: 2020-10-17

## 2020-10-17 VITALS
HEART RATE: 89 BPM | SYSTOLIC BLOOD PRESSURE: 152 MMHG | TEMPERATURE: 97 F | DIASTOLIC BLOOD PRESSURE: 77 MMHG | RESPIRATION RATE: 16 BRPM

## 2020-10-17 LAB
A1C WITH ESTIMATED AVERAGE GLUCOSE RESULT: 8.4 % — HIGH (ref 4–5.6)
ANION GAP SERPL CALC-SCNC: 10 MMOL/L — SIGNIFICANT CHANGE UP (ref 7–14)
BUN SERPL-MCNC: 6 MG/DL — LOW (ref 10–20)
CALCIUM SERPL-MCNC: 8.8 MG/DL — SIGNIFICANT CHANGE UP (ref 8.5–10.1)
CHLORIDE SERPL-SCNC: 104 MMOL/L — SIGNIFICANT CHANGE UP (ref 98–110)
CO2 SERPL-SCNC: 27 MMOL/L — SIGNIFICANT CHANGE UP (ref 17–32)
CREAT SERPL-MCNC: 0.7 MG/DL — SIGNIFICANT CHANGE UP (ref 0.7–1.5)
ESTIMATED AVERAGE GLUCOSE: 194 MG/DL — HIGH (ref 68–114)
GLUCOSE BLDC GLUCOMTR-MCNC: 157 MG/DL — HIGH (ref 70–99)
GLUCOSE BLDC GLUCOMTR-MCNC: 192 MG/DL — HIGH (ref 70–99)
GLUCOSE SERPL-MCNC: 191 MG/DL — HIGH (ref 70–99)
LIDOCAIN IGE QN: 163 U/L — HIGH (ref 7–60)
MAGNESIUM SERPL-MCNC: 1.8 MG/DL — SIGNIFICANT CHANGE UP (ref 1.8–2.4)
POTASSIUM SERPL-MCNC: 4.5 MMOL/L — SIGNIFICANT CHANGE UP (ref 3.5–5)
POTASSIUM SERPL-SCNC: 4.5 MMOL/L — SIGNIFICANT CHANGE UP (ref 3.5–5)
SARS-COV-2 IGG SERPL QL IA: NEGATIVE — SIGNIFICANT CHANGE UP
SARS-COV-2 IGM SERPL IA-ACNC: <0.1 INDEX — SIGNIFICANT CHANGE UP
SODIUM SERPL-SCNC: 141 MMOL/L — SIGNIFICANT CHANGE UP (ref 135–146)

## 2020-10-17 PROCEDURE — 99239 HOSP IP/OBS DSCHRG MGMT >30: CPT

## 2020-10-17 PROCEDURE — 99232 SBSQ HOSP IP/OBS MODERATE 35: CPT

## 2020-10-17 PROCEDURE — 76700 US EXAM ABDOM COMPLETE: CPT | Mod: 26

## 2020-10-17 RX ORDER — POLYETHYLENE GLYCOL 3350 17 G/17G
17 POWDER, FOR SOLUTION ORAL EVERY 12 HOURS
Refills: 0 | Status: DISCONTINUED | OUTPATIENT
Start: 2020-10-17 | End: 2020-10-17

## 2020-10-17 RX ORDER — POLYETHYLENE GLYCOL 3350 17 G/17G
17 POWDER, FOR SOLUTION ORAL
Qty: 0 | Refills: 0 | DISCHARGE
Start: 2020-10-17

## 2020-10-17 RX ORDER — GLIMEPIRIDE 1 MG
0 TABLET ORAL
Qty: 0 | Refills: 0 | DISCHARGE

## 2020-10-17 RX ORDER — SENNA PLUS 8.6 MG/1
2 TABLET ORAL AT BEDTIME
Refills: 0 | Status: DISCONTINUED | OUTPATIENT
Start: 2020-10-17 | End: 2020-10-17

## 2020-10-17 RX ORDER — ATORVASTATIN CALCIUM 80 MG/1
1 TABLET, FILM COATED ORAL
Qty: 0 | Refills: 0 | DISCHARGE
Start: 2020-10-17

## 2020-10-17 RX ORDER — OMEPRAZOLE 10 MG/1
0 CAPSULE, DELAYED RELEASE ORAL
Qty: 0 | Refills: 0 | DISCHARGE

## 2020-10-17 RX ORDER — METFORMIN HYDROCHLORIDE 850 MG/1
0 TABLET ORAL
Qty: 0 | Refills: 0 | DISCHARGE

## 2020-10-17 RX ORDER — SENNA PLUS 8.6 MG/1
2 TABLET ORAL
Qty: 0 | Refills: 0 | DISCHARGE
Start: 2020-10-17

## 2020-10-17 RX ORDER — ATORVASTATIN CALCIUM 80 MG/1
0 TABLET, FILM COATED ORAL
Qty: 0 | Refills: 0 | DISCHARGE

## 2020-10-17 RX ORDER — LOSARTAN POTASSIUM 100 MG/1
1 TABLET, FILM COATED ORAL
Qty: 0 | Refills: 0 | DISCHARGE
Start: 2020-10-17

## 2020-10-17 RX ORDER — LOSARTAN POTASSIUM 100 MG/1
0 TABLET, FILM COATED ORAL
Qty: 0 | Refills: 0 | DISCHARGE

## 2020-10-17 RX ADMIN — SODIUM CHLORIDE 250 MILLILITER(S): 9 INJECTION INTRAMUSCULAR; INTRAVENOUS; SUBCUTANEOUS at 01:25

## 2020-10-17 RX ADMIN — Medication 1: at 07:51

## 2020-10-17 RX ADMIN — PANTOPRAZOLE SODIUM 40 MILLIGRAM(S): 20 TABLET, DELAYED RELEASE ORAL at 05:58

## 2020-10-17 RX ADMIN — POLYETHYLENE GLYCOL 3350 17 GRAM(S): 17 POWDER, FOR SOLUTION ORAL at 11:27

## 2020-10-17 RX ADMIN — SODIUM CHLORIDE 250 MILLILITER(S): 9 INJECTION INTRAMUSCULAR; INTRAVENOUS; SUBCUTANEOUS at 05:59

## 2020-10-17 RX ADMIN — Medication 1: at 11:41

## 2020-10-17 RX ADMIN — Medication 81 MILLIGRAM(S): at 11:42

## 2020-10-17 RX ADMIN — LOSARTAN POTASSIUM 25 MILLIGRAM(S): 100 TABLET, FILM COATED ORAL at 05:58

## 2020-10-17 NOTE — PROGRESS NOTE ADULT - ASSESSMENT
Assessment and Plan: patient with abdominal pain     1)  Acute Pancreatitis.    -tolerated advanced diet   -outpatient follow up with GI   -abdominal US done this am, prelim with no acute abnormality    2) Type 2 diabetes mellitus   -with hyperglycemia  -resume home meds     3) HTN (hypertension).  Plan: monitor on current meds.     4) hyperlipidemia.  Plan: Lipitor     5) GERD (gastroesophageal reflux disease).  Plan: PPI.     6) Abnormal imaging/Lung nodule.   -outpatient follow up with PMD, with repeat imaging     7) suspected Mg2+ def  -repleted     # Progress Note Handoff  PENDING as follows  consults: GI consult appreciated   Test: labs noted   Family discussion: discussed with patient who comprehends his medical care and wants to be discharged today   Disposition: home today     Attending Physician Dr. Melanie Vicente # [id number]

## 2020-10-17 NOTE — PROGRESS NOTE ADULT - SUBJECTIVE AND OBJECTIVE BOX
ONEIL THORNTON  60y  Male      Patient is a 60y old  Male who presents with a chief complaint of pancreatitis (15 Oct 2020 23:48)      INTERVAL HPI/OVERNIGHT EVENTS:  pt seen and examined at bedside this morning  -advanced diet and tolerated  -abdominal US done this am, and with no acute abnormality --- official results to be followed by the patient   -d/c planning home today with an outpatient follow up with GI     REVIEW OF SYSTEMS:  -denies abdominal pain      Vital Signs Last 24 Hrs  T(C): 36.2 (17 Oct 2020 06:33), Max: 36.8 (16 Oct 2020 13:00)  T(F): 97.1 (17 Oct 2020 06:33), Max: 98.3 (16 Oct 2020 13:00)  HR: 89 (17 Oct 2020 06:33) (63 - 91)  BP: 152/77 (17 Oct 2020 06:33) (147/75 - 175/84)  RR: 16 (17 Oct 2020 06:33) (16 - 16)    PHYSICAL EXAM:  GENERAL: NAD, well-groomed, well-developed  HEAD:  Atraumatic, Normocephalic  EYES: EOMI, PERRLA, conjunctiva and sclera clear  NERVOUS SYSTEM:  Alert & Oriented X 4, Good concentration; Motor Strength 5/5 B/L upper and lower extremities; DTRs 2+ intact and symmetric  CHEST/LUNG: Clear to percussion bilaterally; No rales, rhonchi, wheezing, or rubs  CV/HEART: Regular rate and rhythm; No murmurs, rubs, or gallops  GI/ABDOMEN: Soft, Nontender, NONTENDER,  Bowel sounds present  EXTREMITIES:  2+ Peripheral Pulses, No clubbing, cyanosis, or edema  SKIN: No rashes or lesions    LAB:                        14.0   7.02  )-----------( 232      ( 16 Oct 2020 06:55 )             40.3     10-16    138  |  101  |  9<L>  ----------------------------<  202<H>  4.3   |  27  |  0.7    Ca    9.1      16 Oct 2020 06:55  Phos  3.4     10-16  Mg     1.6     10-16    TPro  6.2  /  Alb  3.7  /  TBili  0.7  /  DBili  x   /  AST  15  /  ALT  16  /  AlkPhos  69  10-16      Daily Height in cm: 165.1 (16 Oct 2020 02:50)    Daily   CAPILLARY BLOOD GLUCOSE      POCT Blood Glucose.: 251 mg/dL (16 Oct 2020 11:24)  POCT Blood Glucose.: 183 mg/dL (16 Oct 2020 07:38)  POCT Blood Glucose.: 174 mg/dL (16 Oct 2020 02:51)      LIVER FUNCTIONS - ( 16 Oct 2020 06:55 )  Alb: 3.7 g/dL / Pro: 6.2 g/dL / ALK PHOS: 69 U/L / ALT: 16 U/L / AST: 15 U/L / GGT: x           RADIOLOGY:    Imaging Personally Reviewed:  [Y ] YES  [ ] NO    HEALTH ISSUES - PROBLEM Dx:  Lung nodule  Lung nodule    GERD (gastroesophageal reflux disease)  GERD (gastroesophageal reflux disease)    HLD (hyperlipidemia)  HLD (hyperlipidemia)    HTN (hypertension)  HTN (hypertension)    Type 2 diabetes mellitus without complication, without long-term current use of insulin  Type 2 diabetes mellitus without complication, without long-term current use of insulin    Pancreatitis  Pancreatitis    MEDS:  acetaminophen   Tablet .. 650 milliGRAM(s) Oral every 6 hours PRN  aspirin enteric coated 81 milliGRAM(s) Oral daily  atorvastatin 10 milliGRAM(s) Oral at bedtime  dextrose 40% Gel 15 Gram(s) Oral once PRN  dextrose 5%. 1000 milliLiter(s) IV Continuous <Continuous>  dextrose 50% Injectable 12.5 Gram(s) IV Push once  dextrose 50% Injectable 25 Gram(s) IV Push once  glucagon  Injectable 1 milliGRAM(s) IntraMuscular once PRN  ibuprofen  Tablet. 400 milliGRAM(s) Oral every 6 hours PRN  insulin lispro (HumaLOG) corrective regimen sliding scale   SubCutaneous three times a day before meals  losartan 25 milliGRAM(s) Oral daily  pantoprazole    Tablet 40 milliGRAM(s) Oral before breakfast  sodium chloride 0.9%. 1000 milliLiter(s) IV Continuous <Continuous>  sodium chloride 0.9%. 1000 milliLiter(s) IV Continuous <Continuous>

## 2020-10-17 NOTE — DISCHARGE NOTE PROVIDER - HOSPITAL COURSE
Patient is a 60y old  Male who presents with a chief complaint of abdominal pain.  pt seen and examined at bedside     1)  Acute Pancreatitis.    -clear liquid diet, advance as tolerated  -pain control prn  -GI eval   -IVF    2) Type 2 diabetes mellitus   -with hyperglycemia  - insulin added with parameters     3) HTN (hypertension).  Plan: monitor on current meds.     4) hyperlipidemia.  Plan: Lipitor     5) GERD (gastroesophageal reflux disease).  Plan: PPI.     6) Abnormal imaging/Lung nodule.   -outpatient follow up with PMD, with repeat imaging   Patient is a 60y old  Male who presents with a chief complaint of abdominal pain.  pt seen and examined at bedside     Admitted for Acute Pancreatitis., initially NPO, IVF, labs monitored, seen by GI, lipase downtrended to 163 from 259; tolerated low fat diet   and US done morning of d/c with no acute abnormality (official report to be followed by patient/aware)  -patient aware of the lung nodule and will follow with repeat imaging in the community with PMD  -seen and examined and stable for d/c today  -outpatient follow up with GI     Spent over 35 mins d/c planning

## 2020-10-17 NOTE — PROGRESS NOTE ADULT - SUBJECTIVE AND OBJECTIVE BOX
Gastroenterology progress note:     Patient is a 60y old  Male who presents with a chief complaint of pancreatitis (17 Oct 2020 12:18)       Admitted on: 10-15-20    We are following the patient for: abd pain     Interval History:  Pt reports he is tolerating his diet this morning. Had multiple BMs and pain is much improved.     PAST MEDICAL & SURGICAL HISTORY:  GERD (gastroesophageal reflux disease)    DM (diabetes mellitus)    HTN (hypertension)    HLD (hyperlipidemia)    H/O heart surgery        MEDICATIONS  (STANDING):  aspirin enteric coated 81 milliGRAM(s) Oral daily  atorvastatin 10 milliGRAM(s) Oral at bedtime  dextrose 5%. 1000 milliLiter(s) (50 mL/Hr) IV Continuous <Continuous>  dextrose 50% Injectable 12.5 Gram(s) IV Push once  dextrose 50% Injectable 25 Gram(s) IV Push once  insulin lispro (HumaLOG) corrective regimen sliding scale   SubCutaneous three times a day before meals  losartan 25 milliGRAM(s) Oral daily  pantoprazole    Tablet 40 milliGRAM(s) Oral before breakfast  polyethylene glycol 3350 17 Gram(s) Oral every 12 hours  senna 2 Tablet(s) Oral at bedtime  sodium chloride 0.9%. 1000 milliLiter(s) (250 mL/Hr) IV Continuous <Continuous>    MEDICATIONS  (PRN):  acetaminophen   Tablet .. 650 milliGRAM(s) Oral every 6 hours PRN Mild Pain (1 - 3)  dextrose 40% Gel 15 Gram(s) Oral once PRN Blood Glucose LESS THAN 70 milliGRAM(s)/deciliter  glucagon  Injectable 1 milliGRAM(s) IntraMuscular once PRN Glucose LESS THAN 70 milligrams/deciliter  ibuprofen  Tablet. 400 milliGRAM(s) Oral every 6 hours PRN Moderate Pain (4 - 6)      Allergies  No Known Allergies      Review of Systems:   Cardiovascular:  No Chest Pain, No Palpitations  Respiratory:  No Cough, No Dyspnea  Gastrointestinal:  As described in HPI    Physical Examination:  T(C): 36.2 (10-17-20 @ 06:33), Max: 36.6 (10-16-20 @ 21:20)  HR: 89 (10-17-20 @ 06:33) (89 - 91)  BP: 152/77 (10-17-20 @ 06:33) (147/75 - 152/77)  RR: 16 (10-17-20 @ 06:33) (16 - 16)  SpO2: --      Constitutional: No acute distress.  Respiratory:  No signs of respiratory distress. Lung sounds are clear bilaterally.  Cardiovascular:  S1 S2, Regular rate and rhythm.  Abdominal: Abdomen is soft, symmetric, and non-tender without distention. There are no visible lesions or scars. Bowel sounds are present and normoactive in all four quadrants. No masses, hepatomegaly, or splenomegaly are noted.   Skin: No rashes, No Jaundice.        Data: (reviewed by attending)                        14.0   7.02  )-----------( 232      ( 16 Oct 2020 06:55 )             40.3     Hgb trend:  14.0  10-16-20 @ 06:55  13.2  10-15-20 @ 18:48      10-17    141  |  104  |  6<L>  ----------------------------<  191<H>  4.5   |  27  |  0.7    Ca    8.8      17 Oct 2020 07:23  Phos  3.4     10-16  Mg     1.8     10-17    TPro  6.2  /  Alb  3.7  /  TBili  0.7  /  DBili  x   /  AST  15  /  ALT  16  /  AlkPhos  69  10-16    Liver panel trend:  TBili 0.7   /   AST 15   /   ALT 16   /   AlkP 69   /   Tptn 6.2   /   Alb 3.7    /   DBili --      10-16  TBili 0.5   /   AST 18   /   ALT 19   /   AlkP 73   /   Tptn 6.8   /   Alb 4.1    /   DBili <0.2      10-15      PT/INR - ( 15 Oct 2020 18:15 )   PT: 12.40 sec;   INR: 1.08 ratio         PTT - ( 15 Oct 2020 18:15 )  PTT:34.0 sec       Radiology: (reviewed by attending)    US Abdomen Complete:   EXAM:  US ABDOMEN COMPLETE            PROCEDURE DATE:  10/17/2020            INTERPRETATION:  CLINICAL INFORMATION: Pain    Correlation: CT abdomen and pelvis October 15, 2020.    TECHNIQUE: Sonography of the abdomen.    FINDINGS:    Liver: 14.4 cmin length Within normal limits.  Bile ducts: Normal caliber. Common bile duct measures 0.39 cm.  Gallbladder: Trace sludge Within normal limits.  Pancreas: Obscured by bowel gas.  Spleen: 9.7 cm. In length Within normal limits.  Right kidney: 11.1 cm. In length No hydronephrosis, solid renal mass or calculus  Left kidney: 11.2 cm in length No hydronephrosis, solid renal mass or calculus.    Ascites: None.  Aorta and IVC: Visualized portions are within normal limits.    IMPRESSION:  Gallbladder trace sludge with no sonographic findings to suggest the presence of cholecystitis.                DORINDA CHA M.D., ATTENDING RADIOLOGIST  This document has been electronically signed. Oct 17 2020 12:59PM (10-17-20 @ 10:00)

## 2020-10-17 NOTE — PROGRESS NOTE ADULT - ASSESSMENT
60 year old male with abdominal pain (LLQ, postprandial) elevated Lipase.     Given that CT was unrevealing and abdominal pain is not typical, doubt acute pancreatitis. Most likely constipation, improved with BMs.     Recommendations:  - advance diet as tolerated  - can f/u with GI as outpt - call [phone]

## 2020-10-17 NOTE — DISCHARGE NOTE PROVIDER - NSDCCPCAREPLAN_GEN_ALL_CORE_FT
PRINCIPAL DISCHARGE DIAGNOSIS  Diagnosis: Pancreatitis  Assessment and Plan of Treatment: low fat diet  fup with GI       PRINCIPAL DISCHARGE DIAGNOSIS  Diagnosis: Pancreatitis  Assessment and Plan of Treatment: low fat diet  fup with GI attending/info provided

## 2020-10-17 NOTE — DISCHARGE NOTE PROVIDER - NSDCMRMEDTOKEN_GEN_ALL_CORE_FT
aspirin 81 mg oral tablet: 1 tab(s) orally once a day  atorvastatin:   glimepiride:   losartan:   metFORMIN:   MiraLax oral powder for reconstitution: 17 gram(s) orally every 12 hours  omeprazole:   senna oral tablet: 2 tab(s) orally once a day (at bedtime)   aspirin 81 mg oral tablet: 1 tab(s) orally once a day  atorvastatin 10 mg oral tablet: 1 tab(s) orally once a day (at bedtime)  glimepiride: continue with home dose   losartan 25 mg oral tablet: 1 tab(s) orally once a day  metFORMIN: continue with home dose   MiraLax oral powder for reconstitution: 17 gram(s) orally every 12 hours; over the counter   omeprazole: continue with home dose   senna oral tablet: 2 tab(s) orally once a day (at bedtime)

## 2020-10-17 NOTE — DISCHARGE NOTE PROVIDER - CARE PROVIDER_API CALL
DAFNE CROOKS  Internal Medicine  [address] [place] [address]  Phone: [phone]  Fax: [phone]  Follow Up Time:     Marcello Napoles  GASTROENTEROLOGY  [facility] [place] [address]  Phone: [phone]  Fax: [phone]  Follow Up Time:

## 2020-10-17 NOTE — DISCHARGE NOTE NURSING/CASE MANAGEMENT/SOCIAL WORK - PATIENT PORTAL LINK FT
You can access the FollowMyHealth Patient Portal offered by [facility] by registering at the following website: [email or web address]. By joining [employer]’s FollowMyHealth portal, you will also be able to view your health information using other applications (apps) compatible with our system.

## 2020-10-19 PROBLEM — Z00.00 ENCOUNTER FOR PREVENTIVE HEALTH EXAMINATION: Status: ACTIVE | Noted: 2020-10-19

## 2020-10-20 DIAGNOSIS — Z95.1 PRESENCE OF AORTOCORONARY BYPASS GRAFT: ICD-10-CM

## 2020-10-20 DIAGNOSIS — E11.9 TYPE 2 DIABETES MELLITUS WITHOUT COMPLICATIONS: ICD-10-CM

## 2020-10-20 DIAGNOSIS — K85.90 ACUTE PANCREATITIS WITHOUT NECROSIS OR INFECTION, UNSPECIFIED: ICD-10-CM

## 2020-10-20 DIAGNOSIS — R91.1 SOLITARY PULMONARY NODULE: ICD-10-CM

## 2020-10-20 DIAGNOSIS — Z79.82 LONG TERM (CURRENT) USE OF ASPIRIN: ICD-10-CM

## 2020-10-20 DIAGNOSIS — K21.9 GASTRO-ESOPHAGEAL REFLUX DISEASE WITHOUT ESOPHAGITIS: ICD-10-CM

## 2020-10-20 DIAGNOSIS — I25.10 ATHEROSCLEROTIC HEART DISEASE OF NATIVE CORONARY ARTERY WITHOUT ANGINA PECTORIS: ICD-10-CM

## 2020-10-20 DIAGNOSIS — I10 ESSENTIAL (PRIMARY) HYPERTENSION: ICD-10-CM

## 2020-10-20 DIAGNOSIS — E78.5 HYPERLIPIDEMIA, UNSPECIFIED: ICD-10-CM

## 2020-10-29 ENCOUNTER — APPOINTMENT (OUTPATIENT)
Dept: GASTROENTEROLOGY | Facility: CLINIC | Age: 60
End: 2020-10-29
Payer: SUBSIDIZED

## 2020-10-29 VITALS — TEMPERATURE: 98 F | HEIGHT: 65 IN | BODY MASS INDEX: 33.82 KG/M2 | WEIGHT: 203 LBS

## 2020-10-29 DIAGNOSIS — Z86.39 PERSONAL HISTORY OF OTHER ENDOCRINE, NUTRITIONAL AND METABOLIC DISEASE: ICD-10-CM

## 2020-10-29 DIAGNOSIS — R10.9 UNSPECIFIED ABDOMINAL PAIN: ICD-10-CM

## 2020-10-29 DIAGNOSIS — Z78.9 OTHER SPECIFIED HEALTH STATUS: ICD-10-CM

## 2020-10-29 PROCEDURE — 99072 ADDL SUPL MATRL&STAF TM PHE: CPT

## 2020-10-29 PROCEDURE — 99214 OFFICE O/P EST MOD 30 MIN: CPT

## 2020-10-29 RX ORDER — ATORVASTATIN CALCIUM 40 MG/1
40 TABLET, FILM COATED ORAL
Refills: 0 | Status: ACTIVE | COMMUNITY

## 2020-10-29 RX ORDER — GLIMEPIRIDE 4 MG/1
4 TABLET ORAL
Refills: 0 | Status: ACTIVE | COMMUNITY

## 2020-10-29 RX ORDER — METFORMIN HYDROCHLORIDE 1000 MG/1
1000 TABLET, EXTENDED RELEASE ORAL
Refills: 0 | Status: ACTIVE | COMMUNITY

## 2020-10-29 RX ORDER — METOPROLOL SUCCINATE 50 MG/1
50 TABLET, EXTENDED RELEASE ORAL
Refills: 0 | Status: ACTIVE | COMMUNITY

## 2020-10-29 RX ORDER — SITAGLIPTIN 100 MG/1
100 TABLET, FILM COATED ORAL
Refills: 0 | Status: ACTIVE | COMMUNITY

## 2020-10-29 RX ORDER — ASPIRIN 81 MG
81 TABLET, DELAYED RELEASE (ENTERIC COATED) ORAL
Refills: 0 | Status: ACTIVE | COMMUNITY

## 2020-10-29 RX ORDER — CLOPIDOGREL BISULFATE 75 MG/1
75 TABLET, FILM COATED ORAL
Refills: 0 | Status: ACTIVE | COMMUNITY

## 2020-10-29 NOTE — REVIEW OF SYSTEMS
[Fever] : no fever [Eye Pain] : no eye pain [Heart Rate Is Slow] : the heart rate was not slow [Shortness Of Breath] : no shortness of breath [As Noted in HPI] : as noted in HPI [Dysuria] : no dysuria [Arthralgias] : no arthralgias [Skin Lesions] : no skin lesions [Convulsions] : no convulsions [Easy Bleeding] : no tendency for easy bleeding

## 2020-10-29 NOTE — PHYSICAL EXAM
[General Appearance - Alert] : alert [Sclera] : the sclera and conjunctiva were normal [Outer Ear] : the ears and nose were normal in appearance [Neck Appearance] : the appearance of the neck was normal [Respiration, Rhythm And Depth] : normal respiratory rhythm and effort [Auscultation Breath Sounds / Voice Sounds] : lungs were clear to auscultation bilaterally [Heart Sounds] : normal S1 and S2 [Abdomen Soft] : soft [Abdomen Tenderness] : non-tender [Skin Color & Pigmentation] : normal skin color and pigmentation [Impaired Insight] : insight and judgment were intact

## 2020-10-29 NOTE — ASSESSMENT
[FreeTextEntry1] : 60 year old male recently hospitalized at [facility]. Lipase was elevated but pain was not  typical for pancreatitis and CT did not clearly show pancreatitis. The ultimate cause of his pain was  unclear but resolved. We believe that it was secondary to constipation but the patient denies constipation.\par \par The patient feels back to normal since he left the hospital.\par \par His last colonoscopy was 3 years ago. Results unknown.\par \par - If pain recurs, will initiate workup\par - Patient will obtain records of his last colonoscopy and send it to us so we can determine the appropriate interval\par

## 2020-10-29 NOTE — CONSULT LETTER
[Dear  ___] : Dear  [unfilled], [Consult Letter:] : I had the pleasure of evaluating your patient, [unfilled]. [Please see my note below.] : Please see my note below. [Consult Closing:] : Thank you very much for allowing me to participate in the care of this patient.  If you have any questions, please do not hesitate to contact me. [Sincerely,] : Sincerely, [FreeTextEntry3] : Marcello Napoles MD

## 2020-10-29 NOTE — HISTORY OF PRESENT ILLNESS
[de-identified] : 60 year old male recently hospitalized at [facility]. Lipase was elevated but pain was not  typical for pancreatitis and CT did not clearly show pancreatitis. The ultimate cause of his pain was  unclear but resolved.\par \par The patient feels back to normal since he left the hospital.\par \par His last colonoscopy was 3 years ago. Results unknown.\par \par The patient denies rectal bleeding, melena, diarrhea, constipation, change in bowel habits, change in stool caliber, weight loss,change in appetite, nausea, vomiting, difficulty swallowing, early satiety, abdominal pain, fever or chills.\par

## 2021-08-10 NOTE — PROGRESS NOTE ADULT - NSHPATTENDINGPLANDISCUSS_GEN_ALL_CORE
medical staff Doxycycline Counseling:  Patient counseled regarding possible photosensitivity and increased risk for sunburn.  Patient instructed to avoid sunlight, if possible.  When exposed to sunlight, patients should wear protective clothing, sunglasses, and sunscreen.  The patient was instructed to call the office immediately if the following severe adverse effects occur:  hearing changes, easy bruising/bleeding, severe headache, or vision changes.  The patient verbalized understanding of the proper use and possible adverse effects of doxycycline.  All of the patient's questions and concerns were addressed.

## 2024-05-01 NOTE — ED PROVIDER NOTE - GASTROINTESTINAL NEGATIVE STATEMENT, MLM
ED Provider Note      Patient : Giulia Vickers Age: 62 year old Sex: female   MRN: [id number] Encounter Date: 5/1/2024    Chief Complaint   Patient presents with    Knee Pain       History   HPI: SEE BELOW FOR DETAILS  CC: Knee pain  Location: R knee, posterior and anterior  Duration: yesterday  Onset: Spontaneous  Quality: Soreness  Severity: Mild to moderate  Radiation: None  Exacerbating factors: Movement  Alleviating factors: Rest  Associated symptoms: See below    62F with PMHx HLD, DM, thyroid condition presents to ED with R leg pain which worsened yesterday.  Patient states yesterday, she was attempting to get her R shoe off and with her right foot planted oddly, she feels as though she possibly hyperextended her right knee.  She states she felt \"crunching\" and had an immense amount of pain, which has been improving since. She is denying any calf pain or lower leg swelling, but states she feels the top part of her knee is slightly swollen. She states she has been taking ibuprofen at home with little relief.  She states she is only able to ambulate with crutches from home. She notes a history of right leg pain and swelling, for which she was here 2 weeks ago. Denies any injuries at that time.  She was sent home on naproxen and lidocaine patches. She had a negative US at that time. He has followed up with her PCP who has referred her to vascular surgery for varicose veins. They also recommended compression stockings.    Pt denies redness, numbness, tingling, weakness, lightheadedness, dizziness, fevers, body aches, chills, or any other pain/injuries including foot, ankle, hip pain.   Patient denies any recent surgery or hospitalization, history of DVT or PE, exogenous estrogen use.  Patient states he has been driving her kids to school and at times will be in the car for 3 hours straight. She also notes a hx ovarian CA.    MEDICATION LIST, ALLERGIES, ARE ALL COPIED BELOW AT BOTTOM OF CHART.  PMH/PSH: See  assessement and plan below.  Social Hx: no drugs  Family Hx: Noncontributory.    Review of Systems  REVIEW OF SYMPTOMS  Denies fevers, weakness  Denies headache, neck pain, eye pain, sore throat  Denies chest pain  Denies SOB, cough, wheezing  Denies abd pain, vomiting, diarrhea  Denies dysuria  Denies swelling  Denies rash, skin changes  Denies easy bleeding, bruising  +leg pain    Physical Exam   Physical Exam  Well appearing patient in no distress  HEENT: MMM  Neck: Supple  Heart: Regular rate.  Lungs: no tachypnea. No respiratory distress.  Abdomen: non-distended.  Extremities:   RLE: skin is normal, intact. ROM hip, knee, ankle full. TTP along medial tendon of hamstring. No bony TTP. 2+ DP and PT pulses. Neurovascularly intact. No deformity. Small suprapatellar joint effusion. No erythema, warmth, bruising. Neg anterior and posterior drawers. No explicit laxity to valgus and varus stress.   Large varicose veins to posterior proximal lower leg, pt states these appear similar to baseline. These are mildly tender. No calf tenderness. No erythema or warmth.    LLE: skin is normal, intact. ROM hip, knee, ankle full. Full ROM. No TTP. 2+ DP and PT pulses. Neurovascularly intact. No deformity. No obvious joint effusion. No erythema, warmth, bruising. Neg anterior and posterior drawers. No explicit laxity to valgus and varus stress.   Skin: No rash  Lymph: No Lymphadenopathy  Neuro: A&O, no focal deficits    ED Course     Procedures    RADIOLOGY ARE COPIED BELOW AT BOTTOM OF CHART.      Clinical Impression   Patient : Giulia Vickers Age: 62 year old Sex: female   MRN: [id number] Encounter Date: 5/1/2024    [phone] [facility]. [phone] [facility]. Please call with questions/concerns.    61 yo with hx as above presents to the emergency department with R leg/knee pain s/p fall yesterday. Pt has a 2 week history of R leg pain and swelling that was exacerbated with this injury.  On exam, she has very small suprapatellar  effusion, but no erythema or warmth. She has full ROM RLE. She has large varicose veins to posterior proximal lower leg which pt states appear at baseline. No bony knee tenderness. No ankle or hip tenderness.     XR R knee unremarkable for bony injury.  US RLE neg for DVT.    Pertinent Labs & Imaging studies reviewed. Multiple differential diagnoses were considered. The patient / caregivers were apprised of diagnostic / treatment options including alternate modes of care, in addition to risks and benefits, for this medical condition. Based on this discussion the patient / caregiver agrees with this chosen diagnostic and treatment plan. Signs and symptoms seem consistent with musculoskeletal knee pain. No fevers, chills, nausea, vomiting, systemic symptoms of illness, redness or warmth of the joint, or clinical concern for septic arthritis. No clinical concern for fracture or dislocation.    Therapeutically, patient given toradol and tylenol. Pt reports pain improved s/p medications.    Symptoms improved. Ace wrap was placed on knee. Pt is ambulatory. Discharged home with the following medications: naproxen and tylenol     Told to follow-up with primary care physician for reevaluation, referrals to orthopedic surgery if needed, and physical therapy if needed. Pt is requesting MRI knee, which I explained to patient her PCP or orthopedist can order if necessary. Discussed options of sx management at home. Patient is stable and states they feel ready for discharge. Discussed strict return precautions with patient. Patient told to return to the emergency department immediately for fevers, chills, nausea, vomiting, or if other joints become inflamed and painful.    DIAGNOSIS:  R knee pain  R leg pain     ------------------------------------------------------------------------------------------------------------------------------  Discharge Medication List as of 5/1/2024 11:53 AM        Prior to Admission Medications     Details   levothyroxine 150 MCG tablet Take 1 tablet by mouth 6 days a week.Eprescribe, Disp-90 tablet, R-3      buPROPion XL (WELLBUTRIN XL) 150 MG 24 hr tablet Take 150 mg by mouth daily.Historical Med      lidocaine (LIDODERM) 5 % patch Place 1 patch onto the skin every 12 hours. Remove patch 12 hours after applyingEprescribe, Disp-10 patch, R-0      clonazePAM (KlonoPIN) 0.5 MG tablet Take 1 tablet by mouth 2 times daily as needed for Anxiety.Eprescribe, Disp-60 tablet, R-0      atorvastatin (LIPITOR) 20 MG tablet Take 1 tablet by mouth daily.Eprescribe, Disp-90 tablet, R-3      fluticasone (FLONASE) 50 MCG/ACT nasal spray Spray 1 spray in each nostril in the morning and 1 spray in the evening.Eprescribe, Disp-16 g, R-3      montelukast (SINGULAIR) 10 MG tablet Take 1 tablet by mouth nightly.Eprescribe, Disp-90 tablet, R-3      metformin (GLUCOPHAGE) 1000 MG tablet Take 1 tablet by mouth in the morning and 1 tablet in the evening. Take with meals.Eprescribe, Disp-180 tablet, R-3      loratadine (CLARITIN) 10 MG tablet Take 1 tablet by mouth daily.Historical Med             Allergies   Allergen Reactions    Amoxicillin RASH    Penicillins HIVES       Past Medical History:   Diagnosis Date    Anxiety     Diabetes mellitus  (CMD)     High cholesterol     Thyroid condition        Past Surgical History:   Procedure Laterality Date    APPENDECTOMY      CHOLECYSTECTOMY      HYSTERECTOMY      total    OOPHORECTOMY Bilateral        Family History   Problem Relation Age of Onset    Cancer, Colon Mother     Cancer, Ovarian Mother     Diabetes Mother     Hypothyroid Father        Social History     Tobacco Use    Smoking status: Every Day     Passive exposure: Current    Smokeless tobacco: Never    Tobacco comments:     6 cig a day since 2000   Vaping Use    Vaping status: never used   Substance Use Topics    Alcohol use: Yes     Comment: socially     Drug use: Never       Lab Results     No results found for this visit on  05/01/24.      Radiology Results     Imaging Results              US [facility] LOWER EXTREMITY VENOUS DUPLEX RIGHT (Final result)  Result time 05/01/24 11:03:35      Final result                   Impression:    No evidence of deep venous thrombosis in the right lower extremity.    If symptoms suggesting DVT persist, a followup ultrasound study is  recommended the next day. If this too is negative and symptoms persist,  then ultrasound is recommended in one week. (The rationale for the repeat  exam is that an undetectable calf vein thrombus may propagate superiorly  and become a threat for clinically significant pulmonary embolism, but will  be sonographically detectable once it reaches the popliteal vein.)    FOR PHYSICIAN USE ONLY - Please note that this report was generated using  voice recognition software.  If you require clarification or feel that  there has been an error in this report please contact me through  [employer].  Thank you very much for allowing me to participate in the  care of your patient.      Electronically Signed by: DOMINICK MILES M.D.   Signed on: 5/1/2024 11:03 AM   Workstation ID: [id number]               Narrative:    EXAM: US [facility] LOWER EXTREMITY VENOUS DUPLEX RIGHT    CLINICAL INDICATION: 62 years-old Female right, calf pain/posterior knee  pain, +varicose veins    COMPARISON: 4/17/2024.    FINDINGS:    Duplex compression technique has been used to examine the veins of the  lower extremities.    Color Doppler flow, respiratory variation, and (where possible) complete  compressibility are documented in the right proximal greater saphenous,  common femoral, femoral, proximal profunda femoris, and popliteal veins.  Right posterior tibial and peroneal veins demonstrate spontaneous color  flow and compressibility consistent with patency.    The left common femoral vein was imaged for comparison and is patent.    No fluid collections are seen.                                       XR KNEE  3 VIEWS RIGHT (Final result)  Result time 05/01/24 09:12:09      Final result                   Impression:      1.   Minimal degenerative change of the right knee.  2.   Probable varicose veins.        FOR PHYSICIAN USE ONLY - Please note that this report was generated using  voice recognition software.  If you require clarification or feel that  there has been an error in this report please contact me through  [facility].  Thank you very much for allowing me to participate in the  care of your patient.     Electronically Signed by: DOMINICK MILES M.D.   Signed on: 5/1/2024 9:12 AM   Workstation ID: [id number]               Narrative:    EXAM: XR KNEE 3 VIEWS RIGHT    CLINICAL INDICATION: 62-year-old female with right knee pain and swelling    COMPARISON: None.    FINDINGS: No fracture or dislocation is seen. There is mild narrowing of  the medial compartment. There is minimal tricompartmental osteophyte  formation. Suspected venous varicosities are seen in the medial right knee.                                      ED Medication Orders (From admission, onward)      Ordered Start     Status Ordering Provider    05/01/24 0947 05/01/24 0948  ketorolac (TORADOL) injection 15 mg  ONCE         Last MAR action: Given PEGGY SINGH    05/01/24 0947 05/01/24 0948  acetaminophen (TYLENOL) tablet 1,000 mg  ONCE         Last MAR action: Given PEGGY SINGH                 Medical Decision Making        Disposition          Discharge Medication List as of 5/1/2024 11:53 AM        New Prescriptions    Details   acetaminophen (TYLENOL) 500 MG tablet Take 2 tablets by mouth every 6 hours as needed for Pain or Fever.Eprescribe, Disp-20 tablet, R-0             Discharge after Treatment 5/1/2024 11:48 AM  There is no comment      Peggy Singh PA-C   5/4/2024 8:44 AM       GOPI Richards Brenna, PA-C  05/04/24 1603     no abdominal pain, no bloating, no constipation, no diarrhea, no nausea and no vomiting.

## 2024-08-22 ENCOUNTER — APPOINTMENT (OUTPATIENT)
Dept: ORTHOPEDIC SURGERY | Facility: CLINIC | Age: 64
End: 2024-08-22
Payer: COMMERCIAL

## 2024-08-22 DIAGNOSIS — M25.512 PAIN IN LEFT SHOULDER: ICD-10-CM

## 2024-08-22 PROCEDURE — 99203 OFFICE O/P NEW LOW 30 MIN: CPT

## 2024-08-22 NOTE — HISTORY OF PRESENT ILLNESS
[de-identified] : Patient is here for evaluation of left shoulder pain Affecting quality of life Has pain and weakness with loss of rom Wakes up at night due to pain  NAD Left shoulder: TTP ant GH joint, bicipital groove FF 0-160 (passive 175) ER 40 IR L5 Pain with terminal rom Weakness to abduction and ER Pos Impingement Pos Franklin Pos Cross Arm Adduction Negative instability Positive scapula dyskinesia  XRay Left shoulder negative for fracture, dislocation,  pos for min arthritis from [facility]  Plan went over findings explained the imaging needs an mri left shoulder to assess RC injury pt script fu after mri

## 2024-08-30 ENCOUNTER — RESULT REVIEW (OUTPATIENT)
Age: 64
End: 2024-08-30

## 2024-08-30 ENCOUNTER — OUTPATIENT (OUTPATIENT)
Dept: OUTPATIENT SERVICES | Facility: HOSPITAL | Age: 64
LOS: 1 days | End: 2024-08-30
Payer: COMMERCIAL

## 2024-08-30 DIAGNOSIS — M25.512 PAIN IN LEFT SHOULDER: ICD-10-CM

## 2024-08-30 DIAGNOSIS — Z98.890 OTHER SPECIFIED POSTPROCEDURAL STATES: Chronic | ICD-10-CM

## 2024-08-30 PROCEDURE — 73221 MRI JOINT UPR EXTREM W/O DYE: CPT | Mod: 26,LT

## 2024-08-30 PROCEDURE — 73221 MRI JOINT UPR EXTREM W/O DYE: CPT | Mod: LT

## 2024-08-31 DIAGNOSIS — M25.512 PAIN IN LEFT SHOULDER: ICD-10-CM

## 2024-09-24 ENCOUNTER — NON-APPOINTMENT (OUTPATIENT)
Age: 64
End: 2024-09-24

## 2024-09-24 ENCOUNTER — APPOINTMENT (OUTPATIENT)
Dept: ORTHOPEDIC SURGERY | Facility: CLINIC | Age: 64
End: 2024-09-24
Payer: COMMERCIAL

## 2024-09-24 DIAGNOSIS — M25.512 PAIN IN LEFT SHOULDER: ICD-10-CM

## 2024-09-24 PROCEDURE — 99214 OFFICE O/P EST MOD 30 MIN: CPT

## 2024-10-02 NOTE — HISTORY OF PRESENT ILLNESS
[de-identified] : Patient is here for evaluation of left shoulder pain Affecting quality of life Has pain and weakness with loss of rom Wakes up at night due to pain  NAD Left shoulder: TTP ant GH joint, bicipital groove FF 0-160 (passive 175) ER 40 IR L5 Pain with terminal rom Weakness to abduction and ER Pos Impingement Pos Franklin Pos Cross Arm Adduction Negative instability Positive scapula dyskinesia  XRay Left shoulder negative for fracture, dislocation,  pos for min arthritis from [facility]ology  mri left shoulder IMPRESSION: Complete full-thickness supraspinatus tear with proximal tendon retraction to level of the acromioclavicular joint, and fatty atrophy of the muscle. Infraspinatus insertional tendinosis with punctate interstitial tear proximal to the insertion. Insertional partial-thickness subscapularis tear comprising 50% of the tendon thickness, and fatty atrophy of the muscle. Nonspecific teres minor muscle fatty replacement. Osteoarthritis of the acromioclavicular and glenohumeral joints. Moderate glenohumeral joint effusion with extension to the subacromial bursa via the supraspinatus tear.  plan went over findings with pt explained the imaging and op vs nonop in detail will proceed with surgery due to fRCT left shoulder arthroscopy, rotator cuff repair, decompression, distal clavicle excision, possible open biceps tenodesis  Operative and nonoperative options discussed with patient. Surgical risks, benefits, and alternatives explained. Surgical risks include but are not exclusive to bleeding, infection, neurovascular damage, continued pain, stiffness, scarring, rsd, dvt/pe, potential failure of surgery that may require further surgery in the future. I went over incisions and rehabilitation. All questions answered.

## 2024-10-02 NOTE — HISTORY OF PRESENT ILLNESS
[de-identified] : Patient is here for evaluation of left shoulder pain Affecting quality of life Has pain and weakness with loss of rom Wakes up at night due to pain  NAD Left shoulder: TTP ant GH joint, bicipital groove FF 0-160 (passive 175) ER 40 IR L5 Pain with terminal rom Weakness to abduction and ER Pos Impingement Pos Franklin Pos Cross Arm Adduction Negative instability Positive scapula dyskinesia  XRay Left shoulder negative for fracture, dislocation,  pos for min arthritis from [facility] radiology  mri left shoulder IMPRESSION: Complete full-thickness supraspinatus tear with proximal tendon retraction to level of the acromioclavicular joint, and fatty atrophy of the muscle. Infraspinatus insertional tendinosis with punctate interstitial tear proximal to the insertion. Insertional partial-thickness subscapularis tear comprising 50% of the tendon thickness, and fatty atrophy of the muscle. Nonspecific teres minor muscle fatty replacement. Osteoarthritis of the acromioclavicular and glenohumeral joints. Moderate glenohumeral joint effusion with extension to the subacromial bursa via the supraspinatus tear.  plan went over findings with pt explained the imaging and op vs nonop in detail will proceed with surgery due to fRCT left shoulder arthroscopy, rotator cuff repair, decompression, distal clavicle excision, possible open biceps tenodesis  Operative and nonoperative options discussed with patient. Surgical risks, benefits, and alternatives explained. Surgical risks include but are not exclusive to bleeding, infection, neurovascular damage, continued pain, stiffness, scarring, rsd, dvt/pe, potential failure of surgery that may require further surgery in the future. I went over incisions and rehabilitation. All questions answered.

## 2024-12-24 ENCOUNTER — NON-APPOINTMENT (OUTPATIENT)
Age: 64
End: 2024-12-24

## 2024-12-26 ENCOUNTER — TRANSCRIPTION ENCOUNTER (OUTPATIENT)
Age: 64
End: 2024-12-26

## 2025-01-08 ENCOUNTER — OUTPATIENT (OUTPATIENT)
Dept: OUTPATIENT SERVICES | Facility: HOSPITAL | Age: 65
LOS: 1 days | End: 2025-01-08
Payer: COMMERCIAL

## 2025-01-08 VITALS
TEMPERATURE: 98 F | OXYGEN SATURATION: 99 % | WEIGHT: 195.11 LBS | HEIGHT: 65 IN | SYSTOLIC BLOOD PRESSURE: 138 MMHG | RESPIRATION RATE: 79 BRPM | DIASTOLIC BLOOD PRESSURE: 75 MMHG

## 2025-01-08 DIAGNOSIS — Z98.890 OTHER SPECIFIED POSTPROCEDURAL STATES: Chronic | ICD-10-CM

## 2025-01-08 DIAGNOSIS — M75.102 UNSPECIFIED ROTATOR CUFF TEAR OR RUPTURE OF LEFT SHOULDER, NOT SPECIFIED AS TRAUMATIC: ICD-10-CM

## 2025-01-08 DIAGNOSIS — Z01.818 ENCOUNTER FOR OTHER PREPROCEDURAL EXAMINATION: ICD-10-CM

## 2025-01-08 LAB
A1C WITH ESTIMATED AVERAGE GLUCOSE RESULT: 8.4 % — HIGH (ref 4–5.6)
ALBUMIN SERPL ELPH-MCNC: 4.5 G/DL — SIGNIFICANT CHANGE UP (ref 3.5–5.2)
ALP SERPL-CCNC: 85 U/L — SIGNIFICANT CHANGE UP (ref 30–115)
ALT FLD-CCNC: 21 U/L — SIGNIFICANT CHANGE UP (ref 0–41)
ANION GAP SERPL CALC-SCNC: 10 MMOL/L — SIGNIFICANT CHANGE UP (ref 7–14)
AST SERPL-CCNC: 23 U/L — SIGNIFICANT CHANGE UP (ref 0–41)
BASOPHILS # BLD AUTO: 0.04 K/UL — SIGNIFICANT CHANGE UP (ref 0–0.2)
BASOPHILS NFR BLD AUTO: 0.7 % — SIGNIFICANT CHANGE UP (ref 0–1)
BILIRUB SERPL-MCNC: 0.7 MG/DL — SIGNIFICANT CHANGE UP (ref 0.2–1.2)
BUN SERPL-MCNC: 19 MG/DL — SIGNIFICANT CHANGE UP (ref 10–20)
CALCIUM SERPL-MCNC: 9.5 MG/DL — SIGNIFICANT CHANGE UP (ref 8.4–10.5)
CHLORIDE SERPL-SCNC: 99 MMOL/L — SIGNIFICANT CHANGE UP (ref 98–110)
CO2 SERPL-SCNC: 27 MMOL/L — SIGNIFICANT CHANGE UP (ref 17–32)
CREAT SERPL-MCNC: 1 MG/DL — SIGNIFICANT CHANGE UP (ref 0.7–1.5)
EGFR: 84 ML/MIN/1.73M2 — SIGNIFICANT CHANGE UP
EOSINOPHIL # BLD AUTO: 0.19 K/UL — SIGNIFICANT CHANGE UP (ref 0–0.7)
EOSINOPHIL NFR BLD AUTO: 3.2 % — SIGNIFICANT CHANGE UP (ref 0–8)
ESTIMATED AVERAGE GLUCOSE: 194 MG/DL — HIGH (ref 68–114)
GLUCOSE SERPL-MCNC: 200 MG/DL — HIGH (ref 70–99)
HCT VFR BLD CALC: 46 % — SIGNIFICANT CHANGE UP (ref 42–52)
HGB BLD-MCNC: 16.1 G/DL — SIGNIFICANT CHANGE UP (ref 14–18)
IMM GRANULOCYTES NFR BLD AUTO: 0.5 % — HIGH (ref 0.1–0.3)
LYMPHOCYTES # BLD AUTO: 1.69 K/UL — SIGNIFICANT CHANGE UP (ref 1.2–3.4)
LYMPHOCYTES # BLD AUTO: 28.6 % — SIGNIFICANT CHANGE UP (ref 20.5–51.1)
MCHC RBC-ENTMCNC: 30.8 PG — SIGNIFICANT CHANGE UP (ref 27–31)
MCHC RBC-ENTMCNC: 35 G/DL — SIGNIFICANT CHANGE UP (ref 32–37)
MCV RBC AUTO: 88 FL — SIGNIFICANT CHANGE UP (ref 80–94)
MONOCYTES # BLD AUTO: 0.62 K/UL — HIGH (ref 0.1–0.6)
MONOCYTES NFR BLD AUTO: 10.5 % — HIGH (ref 1.7–9.3)
NEUTROPHILS # BLD AUTO: 3.33 K/UL — SIGNIFICANT CHANGE UP (ref 1.4–6.5)
NEUTROPHILS NFR BLD AUTO: 56.5 % — SIGNIFICANT CHANGE UP (ref 42.2–75.2)
NRBC # BLD: 0 /100 WBCS — SIGNIFICANT CHANGE UP (ref 0–0)
PLATELET # BLD AUTO: 196 K/UL — SIGNIFICANT CHANGE UP (ref 130–400)
PMV BLD: 11.1 FL — HIGH (ref 7.4–10.4)
POTASSIUM SERPL-MCNC: 4.7 MMOL/L — SIGNIFICANT CHANGE UP (ref 3.5–5)
POTASSIUM SERPL-SCNC: 4.7 MMOL/L — SIGNIFICANT CHANGE UP (ref 3.5–5)
PROT SERPL-MCNC: 6.8 G/DL — SIGNIFICANT CHANGE UP (ref 6–8)
RBC # BLD: 5.23 M/UL — SIGNIFICANT CHANGE UP (ref 4.7–6.1)
RBC # FLD: 12.7 % — SIGNIFICANT CHANGE UP (ref 11.5–14.5)
SODIUM SERPL-SCNC: 136 MMOL/L — SIGNIFICANT CHANGE UP (ref 135–146)
WBC # BLD: 5.9 K/UL — SIGNIFICANT CHANGE UP (ref 4.8–10.8)
WBC # FLD AUTO: 5.9 K/UL — SIGNIFICANT CHANGE UP (ref 4.8–10.8)

## 2025-01-08 PROCEDURE — 93010 ELECTROCARDIOGRAM REPORT: CPT

## 2025-01-08 PROCEDURE — 93005 ELECTROCARDIOGRAM TRACING: CPT

## 2025-01-08 PROCEDURE — 80053 COMPREHEN METABOLIC PANEL: CPT

## 2025-01-08 PROCEDURE — 83036 HEMOGLOBIN GLYCOSYLATED A1C: CPT

## 2025-01-08 PROCEDURE — 99214 OFFICE O/P EST MOD 30 MIN: CPT | Mod: 25

## 2025-01-08 PROCEDURE — 85025 COMPLETE CBC W/AUTO DIFF WBC: CPT

## 2025-01-08 PROCEDURE — 36415 COLL VENOUS BLD VENIPUNCTURE: CPT

## 2025-01-08 RX ORDER — EMPAGLIFLOZIN 10 MG/1
1 TABLET, FILM COATED ORAL
Refills: 0 | DISCHARGE

## 2025-01-08 RX ORDER — ATORVASTATIN CALCIUM 40 MG/1
1 TABLET, FILM COATED ORAL
Refills: 0 | DISCHARGE

## 2025-01-08 NOTE — H&P PST ADULT - NSICDXPASTMEDICALHX_GEN_ALL_CORE_FT
PAST MEDICAL HISTORY:  DM (diabetes mellitus)     GERD (gastroesophageal reflux disease)     HLD (hyperlipidemia)     HTN (hypertension)     Myocardial infarct     S/P CABG x 4

## 2025-01-08 NOTE — H&P PST ADULT - HISTORY OF PRESENT ILLNESS
PATIENT/GUARDIAN CURRENTLY DENIES CHEST PAIN  SHORTNESS OF BREATH  PALPITATIONS,  DYSURIA, OR UPPER RESPIRATORY INFECTION IN PAST 2 WEEKS  denies travel outside the [place] in the past 30 days    Anesthesia Alert  NO--Difficult Airway; class III  NO--History of neck surgery or radiation  NO--Limited ROM of neck  NO--History of Malignant hyperthermia  NO--No personal or family history of Pseudocholinesterase deficiency.  NO--Prior Anesthesia Complication  NO--Latex Allergy  NO--Loose teeth  NO--History of Rheumatoid Arthritis  NO--Bleeding risk  NO--ZURI  NO--Other_____    PT/GUARDIAN DENIES ANY RASHES, ABRASION, OR OPEN WOUNDS OR CUTS    AS PER THE PT/GUARDIAN, THIS IS HIS/HER COMPLETE MEDICAL AND SURGICAL HX, INCLUDING MEDICATIONS PRESCRIBED AND OVER THE COUNTER    Patient/guardian understands the instructions and was given the opportunity to ask questions and have them answered.        Duke Activity Status Index (DASI): Duke Activity Status Index (DASI) from [employer]  on 1/8/2025  ** All calculations should be rechecked by clinician prior to use **    RESULT SUMMARY:  58.2 points  The higher the score (maximum 58.2), the higher the functional status.    9.89 METs        INPUTS:  Take care of self —> 2.75 = Yes  Walk indoors —> 1.75 = Yes  Walk 1&ndash;2 blocks on level ground —> 2.75 = Yes  Climb a flight of stairs or walk up a hill —> 5.5 = Yes  Run a short distance —> 8 = Yes  Do light work around the house —> 2.7 = Yes  Do moderate work around the house —> 3.5 = Yes  Do heavy work around the house —> 8 = Yes  Do yardwork —> 4.5 = Yes  Have sexual relations —> 5.25 = Yes  Participate in moderate recreational activities —> 6 = Yes  Participate in strenuous sports —> 7.5 = Yes        Revised Cardiac Risk Index for Pre-Operative Risk  Revised Cardiac Risk Index for Pre-Operative Risk from [employer]  on 1/8/2025  ** All calculations should be rechecked by clinician prior to use **    RESULT SUMMARY:  1 points  RCRI Score    6.0 %  Risk of major cardiac event      INPUTS:  High-risk surgery —> 0 = No  History of ischemic heart disease —> 1 = Yes  History of congestive heart failure —> 0 = No  History of cerebrovascular disease —> 0 = No  Pre-operative treatment with insulin —> 0 = No  Pre-operative creatinine >2 mg/dL / 176.8 µmol/L —> 0 = No

## 2025-01-08 NOTE — H&P PST ADULT - REASON FOR ADMISSION
Patient is a   64 year old  male presenting to PAST in preparation for  Left Shoulder Arthroscopy, Rotator Cuff Repair, Decompression, Distal Clavicle Excision  on   1/22/25 under general  anesthesia by Dr. Harrison.  The patient has been experiencing severe left shoulder pain w/ decreased ROM. He does not recall any specific injury ; MRI does show full thickness tear.  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   He has a hx of  MI with subsequent CABG many years ago w/o any c/o cp, sob or palpitations , since surgery; DM, HL,  Denies ZURI or prior anesthesia complication.  Reviewed pre op plan of care.  He was last seen by Dr. Archer (cardiology) and PCP approximately 3 mos ago; will request he be seen by PCP and we will obtain medical clearance pre op.  He will hold ASA ( 5 days) pre op but will defer to Dr. Bradford for instruction.   He will continue BP medications day of surgery; hold jardiance x3 days prior; last doses of metformin and glimepiride will be 1/21, day prior to surgery.   He is currently not taking any pain medications; he will avoid all NSAIDs and OTC vitamins/ supplements x7 days prior.   Labs and EKG today.   Written instruction provided; pt and daughter verbalized understanding; aware to obtain medical clearance.

## 2025-01-09 DIAGNOSIS — Z01.818 ENCOUNTER FOR OTHER PREPROCEDURAL EXAMINATION: ICD-10-CM

## 2025-01-09 DIAGNOSIS — M75.102 UNSPECIFIED ROTATOR CUFF TEAR OR RUPTURE OF LEFT SHOULDER, NOT SPECIFIED AS TRAUMATIC: ICD-10-CM

## 2025-02-12 PROBLEM — I21.9 ACUTE MYOCARDIAL INFARCTION, UNSPECIFIED: Chronic | Status: ACTIVE | Noted: 2025-01-08

## 2025-02-27 PROCEDURE — 93010 ELECTROCARDIOGRAM REPORT: CPT

## 2025-02-28 ENCOUNTER — OUTPATIENT (OUTPATIENT)
Dept: OUTPATIENT SERVICES | Facility: HOSPITAL | Age: 65
LOS: 1 days | End: 2025-02-28
Payer: COMMERCIAL

## 2025-02-28 VITALS
TEMPERATURE: 98 F | HEART RATE: 92 BPM | RESPIRATION RATE: 16 BRPM | WEIGHT: 192.02 LBS | SYSTOLIC BLOOD PRESSURE: 126 MMHG | HEIGHT: 65 IN | DIASTOLIC BLOOD PRESSURE: 85 MMHG | OXYGEN SATURATION: 100 %

## 2025-02-28 DIAGNOSIS — Z01.818 ENCOUNTER FOR OTHER PREPROCEDURAL EXAMINATION: ICD-10-CM

## 2025-02-28 DIAGNOSIS — M75.102 UNSPECIFIED ROTATOR CUFF TEAR OR RUPTURE OF LEFT SHOULDER, NOT SPECIFIED AS TRAUMATIC: ICD-10-CM

## 2025-02-28 DIAGNOSIS — Z95.1 PRESENCE OF AORTOCORONARY BYPASS GRAFT: Chronic | ICD-10-CM

## 2025-02-28 LAB
A1C WITH ESTIMATED AVERAGE GLUCOSE RESULT: 8.1 % — HIGH (ref 4–5.6)
ALBUMIN SERPL ELPH-MCNC: 4.3 G/DL — SIGNIFICANT CHANGE UP (ref 3.5–5.2)
ALP SERPL-CCNC: 78 U/L — SIGNIFICANT CHANGE UP (ref 30–115)
ALT FLD-CCNC: 16 U/L — SIGNIFICANT CHANGE UP (ref 0–41)
ANION GAP SERPL CALC-SCNC: 11 MMOL/L — SIGNIFICANT CHANGE UP (ref 7–14)
AST SERPL-CCNC: 16 U/L — SIGNIFICANT CHANGE UP (ref 0–41)
BILIRUB SERPL-MCNC: 0.6 MG/DL — SIGNIFICANT CHANGE UP (ref 0.2–1.2)
BUN SERPL-MCNC: 18 MG/DL — SIGNIFICANT CHANGE UP (ref 10–20)
CALCIUM SERPL-MCNC: 9.7 MG/DL — SIGNIFICANT CHANGE UP (ref 8.4–10.5)
CHLORIDE SERPL-SCNC: 101 MMOL/L — SIGNIFICANT CHANGE UP (ref 98–110)
CO2 SERPL-SCNC: 26 MMOL/L — SIGNIFICANT CHANGE UP (ref 17–32)
CREAT SERPL-MCNC: 0.9 MG/DL — SIGNIFICANT CHANGE UP (ref 0.7–1.5)
EGFR: 95 ML/MIN/1.73M2 — SIGNIFICANT CHANGE UP
ESTIMATED AVERAGE GLUCOSE: 186 MG/DL — HIGH (ref 68–114)
GLUCOSE SERPL-MCNC: 88 MG/DL — SIGNIFICANT CHANGE UP (ref 70–99)
POTASSIUM SERPL-MCNC: 5.1 MMOL/L — HIGH (ref 3.5–5)
POTASSIUM SERPL-SCNC: 5.1 MMOL/L — HIGH (ref 3.5–5)
PROT SERPL-MCNC: 7 G/DL — SIGNIFICANT CHANGE UP (ref 6–8)
SODIUM SERPL-SCNC: 138 MMOL/L — SIGNIFICANT CHANGE UP (ref 135–146)

## 2025-02-28 PROCEDURE — 99214 OFFICE O/P EST MOD 30 MIN: CPT | Mod: 25

## 2025-02-28 PROCEDURE — 36415 COLL VENOUS BLD VENIPUNCTURE: CPT

## 2025-02-28 PROCEDURE — 83036 HEMOGLOBIN GLYCOSYLATED A1C: CPT

## 2025-02-28 PROCEDURE — 93005 ELECTROCARDIOGRAM TRACING: CPT

## 2025-02-28 PROCEDURE — 80053 COMPREHEN METABOLIC PANEL: CPT

## 2025-02-28 NOTE — H&P PST ADULT - NSICDXFAMILYHX_GEN_ALL_CORE_FT
FAMILY HISTORY:  Father  Still living? Unknown  Family history of diabetes mellitus (DM), Age at diagnosis: Age Unknown    Mother  Still living? No  Family history of diabetes mellitus (DM), Age at diagnosis: Age Unknown    Sibling  Still living? Yes, Estimated age: Age Unknown  FH: CAD (coronary artery disease), Age at diagnosis: Age Unknown

## 2025-02-28 NOTE — H&P PST ADULT - REASON FOR ADMISSION
63 y/o male here for PAST. Patient reports he  has been experiencing severe left shoulder pain w/ decreased ROM. He does not recall any specific injury ; MRI does show full thickness tear.  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   He has a hx of  MI with subsequent CABG many years ago w/o any c/o cp, sob or palpitations , since surgery; DM, HL,  Denies ZURI or prior anesthesia complication.  He was supposed to have surgery last month, bur his A1C was 8.1. Patient states his PCP started him on insulin.  Now for scheduled Left Shoulder Arthroscopy, Rotator Cuff Repair, Decompression, Distal Clavicle Excision on 3/17/2525 under general  anesthesia by Dr. Harrison.

## 2025-02-28 NOTE — H&P PST ADULT - HISTORY OF PRESENT ILLNESS
PATIENT/GUARDIAN CURRENTLY DENIES CHEST PAIN  SHORTNESS OF BREATH  PALPITATIONS,  DYSURIA, OR UPPER RESPIRATORY INFECTION IN PAST 2 WEEKS  denies travel outside the [place] in the past 30 days    Anesthesia Alert  NO--Difficult Airway; class III  NO--History of neck surgery or radiation  NO--Limited ROM of neck  NO--History of Malignant hyperthermia  NO--No personal or family history of Pseudocholinesterase deficiency.  NO--Prior Anesthesia Complication  NO--Latex Allergy  NO--Loose teeth  NO--History of Rheumatoid Arthritis  NO--Bleeding risk  NO--ZURI      PT/GUARDIAN DENIES ANY RASHES, ABRASION, OR OPEN WOUNDS OR CUTS    AS PER THE PT/GUARDIAN, THIS IS HIS/HER COMPLETE MEDICAL AND SURGICAL HX, INCLUDING MEDICATIONS PRESCRIBED AND OVER THE COUNTER    Patient/guardian understands the instructions and was given the opportunity to ask questions and have them answered.        Duke Activity Status Index (DASI): Duke Activity Status Index (DASI) =9.89 METS    Revised Cardiac Risk Index for Pre-Operative Risk=1 points

## 2025-02-28 NOTE — H&P PST ADULT - NSICDXPASTMEDICALHX_GEN_ALL_CORE_FT
PAST MEDICAL HISTORY:  DM (diabetes mellitus)     GERD (gastroesophageal reflux disease)     HLD (hyperlipidemia)     HTN (hypertension)     Myocardial infarct

## 2025-03-01 DIAGNOSIS — M75.102 UNSPECIFIED ROTATOR CUFF TEAR OR RUPTURE OF LEFT SHOULDER, NOT SPECIFIED AS TRAUMATIC: ICD-10-CM

## 2025-03-01 DIAGNOSIS — Z01.818 ENCOUNTER FOR OTHER PREPROCEDURAL EXAMINATION: ICD-10-CM

## 2025-03-14 NOTE — ASU PATIENT PROFILE, ADULT - FALL HARM RISK - UNIVERSAL INTERVENTIONS
Bed in lowest position, wheels locked, appropriate side rails in place/Call bell, personal items and telephone in reach/Instruct patient to call for assistance before getting out of bed or chair/Non-slip footwear when patient is out of bed/[place] to call system/Physically safe environment - no spills, clutter or unnecessary equipment/Purposeful Proactive Rounding/Room/bathroom lighting operational, light cord in reach
Yes

## 2025-03-17 ENCOUNTER — TRANSCRIPTION ENCOUNTER (OUTPATIENT)
Age: 65
End: 2025-03-17

## 2025-03-17 ENCOUNTER — APPOINTMENT (OUTPATIENT)
Dept: ORTHOPEDIC SURGERY | Facility: AMBULATORY SURGERY CENTER | Age: 65
End: 2025-03-17

## 2025-03-17 ENCOUNTER — OUTPATIENT (OUTPATIENT)
Dept: OUTPATIENT SERVICES | Facility: HOSPITAL | Age: 65
LOS: 1 days | Discharge: HOME | End: 2025-03-17
Payer: COMMERCIAL

## 2025-03-17 VITALS
SYSTOLIC BLOOD PRESSURE: 145 MMHG | WEIGHT: 192.02 LBS | HEART RATE: 78 BPM | TEMPERATURE: 98 F | HEIGHT: 65 IN | DIASTOLIC BLOOD PRESSURE: 75 MMHG | OXYGEN SATURATION: 99 % | RESPIRATION RATE: 19 BRPM

## 2025-03-17 VITALS
DIASTOLIC BLOOD PRESSURE: 63 MMHG | SYSTOLIC BLOOD PRESSURE: 113 MMHG | HEART RATE: 83 BPM | RESPIRATION RATE: 18 BRPM | OXYGEN SATURATION: 97 %

## 2025-03-17 DIAGNOSIS — Z95.1 PRESENCE OF AORTOCORONARY BYPASS GRAFT: Chronic | ICD-10-CM

## 2025-03-17 DIAGNOSIS — Z79.82 LONG TERM (CURRENT) USE OF ASPIRIN: ICD-10-CM
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
## 2025-03-17 DIAGNOSIS — M75.52 BURSITIS OF LEFT SHOULDER: ICD-10-CM

## 2025-03-17 DIAGNOSIS — Z87.891 PERSONAL HISTORY OF NICOTINE DEPENDENCE: ICD-10-CM

## 2025-03-17 DIAGNOSIS — Z95.1 PRESENCE OF AORTOCORONARY BYPASS GRAFT: ICD-10-CM

## 2025-03-17 DIAGNOSIS — Z87.39 PERSONAL HISTORY OF OTHER DISEASES OF THE MUSCULOSKELETAL SYSTEM AND CONNECTIVE TISSUE: ICD-10-CM

## 2025-03-17 DIAGNOSIS — E78.5 HYPERLIPIDEMIA, UNSPECIFIED: ICD-10-CM

## 2025-03-17 DIAGNOSIS — M75.22 BICIPITAL TENDINITIS, LEFT SHOULDER: ICD-10-CM

## 2025-03-17 DIAGNOSIS — I10 ESSENTIAL (PRIMARY) HYPERTENSION: ICD-10-CM

## 2025-03-17 DIAGNOSIS — K21.9 GASTRO-ESOPHAGEAL REFLUX DISEASE WITHOUT ESOPHAGITIS: ICD-10-CM
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
## 2025-03-17 DIAGNOSIS — S46.212A STRAIN OF MUSCLE, FASCIA AND TENDON OF OTHER PARTS OF BICEPS, LEFT ARM, INITIAL ENCOUNTER: ICD-10-CM

## 2025-03-17 DIAGNOSIS — E11.9 TYPE 2 DIABETES MELLITUS WITHOUT COMPLICATIONS: ICD-10-CM

## 2025-03-17 DIAGNOSIS — M94.212 CHONDROMALACIA, LEFT SHOULDER: ICD-10-CM
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
## 2025-03-17 DIAGNOSIS — S43.432A SUPERIOR GLENOID LABRUM LESION OF LEFT SHOULDER, INITIAL ENCOUNTER: ICD-10-CM

## 2025-03-17 DIAGNOSIS — Z79.84 LONG TERM (CURRENT) USE OF ORAL HYPOGLYCEMIC DRUGS: ICD-10-CM

## 2025-03-17 DIAGNOSIS — M19.012 PRIMARY OSTEOARTHRITIS, LEFT SHOULDER: ICD-10-CM

## 2025-03-17 DIAGNOSIS — M65.912 UNSPECIFIED SYNOVITIS AND TENOSYNOVITIS, LEFT SHOULDER: ICD-10-CM

## 2025-03-17 DIAGNOSIS — I25.2 OLD MYOCARDIAL INFARCTION: ICD-10-CM

## 2025-03-17 DIAGNOSIS — Z79.4 LONG TERM (CURRENT) USE OF INSULIN: ICD-10-CM

## 2025-03-17 DIAGNOSIS — M75.102 UNSPECIFIED ROTATOR CUFF TEAR OR RUPTURE OF LEFT SHOULDER, NOT SPECIFIED AS TRAUMATIC: ICD-10-CM

## 2025-03-17 PROCEDURE — C9399: CPT

## 2025-03-17 PROCEDURE — C1713: CPT

## 2025-03-17 PROCEDURE — 29828 SHO ARTHRS SRG BICP TENODSIS: CPT | Mod: LT

## 2025-03-17 PROCEDURE — 29827 SHO ARTHRS SRG RT8TR CUF RPR: CPT | Mod: LT

## 2025-03-17 PROCEDURE — 29824 SHO ARTHRS SRG DSTL CLAVICLC: CPT | Mod: LT

## 2025-03-17 PROCEDURE — 29826 SHO ARTHRS SRG DECOMPRESSION: CPT | Mod: LT

## 2025-03-17 PROCEDURE — 29823 SHO ARTHRS SRG XTNSV DBRDMT: CPT | Mod: LT

## 2025-03-17 PROCEDURE — 82962 GLUCOSE BLOOD TEST: CPT

## 2025-03-17 RX ORDER — OXYCODONE HYDROCHLORIDE AND ACETAMINOPHEN 10; 325 MG/1; MG/1
1 TABLET ORAL
Qty: 30 | Refills: 0
Start: 2025-03-17

## 2025-03-17 RX ORDER — SODIUM CHLORIDE 9 G/1000ML
1000 INJECTION, SOLUTION INTRAVENOUS
Refills: 0 | Status: DISCONTINUED | OUTPATIENT
Start: 2025-03-17 | End: 2025-03-17

## 2025-03-17 RX ORDER — ONDANSETRON HCL/PF 4 MG/2 ML
4 VIAL (ML) INJECTION ONCE
Refills: 0 | Status: DISCONTINUED | OUTPATIENT
Start: 2025-03-17 | End: 2025-03-17

## 2025-03-17 RX ORDER — ESOMEPRAZOLE MAGNESIUM 40 MG/1
1 CAPSULE, DELAYED RELEASE ORAL
Refills: 0 | DISCHARGE

## 2025-03-17 RX ORDER — HYDROMORPHONE/SOD CHLOR,ISO/PF 2 MG/10 ML
0.5 SYRINGE (ML) INJECTION
Refills: 0 | Status: DISCONTINUED | OUTPATIENT
Start: 2025-03-17 | End: 2025-03-17

## 2025-03-17 RX ORDER — ACETAMINOPHEN 500 MG/5ML
1000 LIQUID (ML) ORAL ONCE
Refills: 0 | Status: DISCONTINUED | OUTPATIENT
Start: 2025-03-17 | End: 2025-03-17

## 2025-03-17 RX ORDER — INSULIN LISPRO 100 U/ML
10 INJECTION, SOLUTION INTRAVENOUS; SUBCUTANEOUS
Refills: 0 | DISCHARGE

## 2025-03-17 RX ORDER — OXYCODONE HYDROCHLORIDE 30 MG/1
5 TABLET ORAL ONCE
Refills: 0 | Status: DISCONTINUED | OUTPATIENT
Start: 2025-03-17 | End: 2025-03-17

## 2025-03-17 RX ADMIN — SODIUM CHLORIDE 100 MILLILITER(S): 9 INJECTION, SOLUTION INTRAVENOUS at 09:50

## 2025-03-17 NOTE — PRE-ANESTHESIA EVALUATION ADULT - NSPROPOSEDPROCEDFT_GEN_ALL_CORE
Spoke to pt to Confirm appt and notifies appt 06/09/23 should be postponed due to visit with Dr Wagner, per Dr Mensah nurse appt 06/20/23.  
left shoulder arthroscopy

## 2025-03-17 NOTE — ASU DISCHARGE PLAN (ADULT/PEDIATRIC) - FINANCIAL ASSISTANCE
[facility] provides services at a reduced cost to those who are determined to be eligible through [facility]’s financial assistance program. Information regarding [facility]’s financial assistance program can be found by going to [email or web address] or by calling [phone].

## 2025-03-17 NOTE — ASU DISCHARGE PLAN (ADULT/PEDIATRIC) - ASU DC SPECIAL INSTRUCTIONSFT
Post Operative Instructions for Shoulder Surgery    Your Surgery Included  [x ] Rotator Cuff Repair			  [x ] Debridement				  [x ] Biceps Tenodesis/Tenotomy	  [ ] Distal Clavicle Resection		  [ ] SLAP Repair  [ ] Instability Repair  [ ] Lysis of Adhesions/Manipulation  [ ] Other:  	  Call our office ([phone]) immediately if you experience any of the following:  •	Excessive bleeding or pus like drainage at the incision site  •	Uncontrollable pain not relieved by pain medication  •	Excessive swelling or redness at the incision site  •	Fever above 101.5 degrees not controlled with Tylenol or Motrin  •	Shortness of Breath  •	Any foul odor or blistering from the surgery site    Pain Management: You were given one or more of the following medication prescriptions before leaving the hospital. Have the prescriptions filled at a pharmacy on your way home and follow the instructions on the bottles.   Regional Anesthesia Injections (Blocks): You may have been given a regional nerve block either before or after surgery. This may numb your shoulder for 24-36 hours    Diet: Eat a bland diet for the first day after surgery. Progress your diet as tolerated. Constipation may occur with Narcotic usage, contact our office if you are experiencing constipation.    Activity: After you arrive at home, spend most of the first 24 hours resting in bed, on the couch, or in a reclining chair. After the first 24 hours at home, slowly increase your activity level based on your symptoms.    Dressing Change: Remove the dressing on the 3rd day. It is normal for some blood to be seen on the dressings. It is also normal for you to see apparent bruising on the skin around your shoulder when you remove the dressing. If present, leave the steri-strip tape across the incisions. If you are concerned by the drainage or the appearance of your shoulder, please call one of the numbers listed below. Keep incisions covered with Band-Aids/bandages.    Showering: You may shower on the 5th day after surgery if the wound is dry and clean, but do not let the wound soak in water until sutures are removed. Do not submerge in any water until after your postoperative appointment in clinic.    Shoulder Sling: You may have been sent home with a sling / pillow attachment holding your arm away from your body. You may remove the sling when changing clothes or bathing. Make sure to wear the sling while sleeping unless instructed otherwise. You may remove for exercises.    Shoulder Exercises: You may do these exercises for 2-5 mins five times a day in order to help regain your range of motion.  [x ] Shoulder Shrugs: Shrug your shoulders up and down  [x ] Pendulums: Bend forward allowing your arm to hang down in front of you. Gently swing your arm side-to-side and front to back  [x ] Elbow range of motion: Straighten and bend your elbow  [x ] Scapula Retractions: Squeeze shoulder blades together while slightly pulling them down  [ ] Passive Abduction: Have family member lift your arm away from your body bringing your elbow to the level of your shoulder  [ ] Shoulder rotation: With your arm at your side, have a family member rotate your arm internally and externally  [ ] Pulley exercises: Put a towel over the top of a door and face the door, use your good arm to pull your arm up in front of you    Follow Up: As Scheduled

## 2025-03-17 NOTE — ASU DISCHARGE PLAN (ADULT/PEDIATRIC) - CARE PROVIDER_API CALL
Ten Harrison  Orthopaedic Surgery  [address] [place] [address]  Phone: [phone]  Fax: [phone]  Follow Up Time:   
Yes

## 2025-03-17 NOTE — PRE-ANESTHESIA EVALUATION ADULT - NSANTHASARD_GEN_ALL_CORE
Problem: Adult Inpatient Plan of Care  Goal: Plan of Care Review  Outcome: Ongoing (interventions implemented as appropriate)  Patient on RA, no respiratory distress noted. Will continue to monitor.         3

## 2025-03-17 NOTE — CHART NOTE - NSCHARTNOTEFT_GEN_A_CORE
PACU ANESTHESIA ADMISSION NOTE      Procedure: Acromioplasty, arthroscopic, with rotator cuff repair      Post op diagnosis:  History of rotator cuff tear      __x__  Patent Airway    __x__  Full return of protective reflexes    ____  Full recovery from anesthesia / back to baseline     Vitals:   see anesthesia record      Mental Status:  __x__ Awake   _____ Alert   _____ Drowsy   _____ Sedated    Nausea/Vomiting:  __x__ NO  ______Yes,   See Post - Op Orders          Pain Scale (0-10):  _____    Treatment: ____ None    ___x_ See Post - Op/PCA Orders    Post - Operative Fluids:   ____ Oral   __x__ See Post - Op Orders    Plan: Discharge:   __x__Home       _____Floor     _____Critical Care    _____  Other:_________________    Comments:  Uneventful intraoperative course. No anesthesia issues or complications noted. Patient stable upon arrival to PACU. Report given to RN. Discharge when criteria met.

## 2025-03-17 NOTE — BRIEF OPERATIVE NOTE - NSICDXBRIEFPROCEDURE_GEN_ALL_CORE_FT
PROCEDURES:  Acromioplasty, arthroscopic, with rotator cuff repair 17-Mar-2025 09:21:00  Ten Harrison

## 2025-03-25 ENCOUNTER — APPOINTMENT (OUTPATIENT)
Dept: ORTHOPEDIC SURGERY | Facility: CLINIC | Age: 65
End: 2025-03-25
Payer: COMMERCIAL

## 2025-03-25 DIAGNOSIS — M25.512 PAIN IN LEFT SHOULDER: ICD-10-CM

## 2025-03-25 PROCEDURE — 99024 POSTOP FOLLOW-UP VISIT: CPT

## 2025-05-02 ENCOUNTER — APPOINTMENT (OUTPATIENT)
Dept: ORTHOPEDIC SURGERY | Facility: CLINIC | Age: 65
End: 2025-05-02
Payer: COMMERCIAL

## 2025-05-02 DIAGNOSIS — M25.512 PAIN IN LEFT SHOULDER: ICD-10-CM

## 2025-05-02 PROCEDURE — 99024 POSTOP FOLLOW-UP VISIT: CPT

## 2025-05-02 RX ORDER — TIZANIDINE 2 MG/1
2 TABLET ORAL
Qty: 30 | Refills: 1 | Status: ACTIVE | COMMUNITY
Start: 2025-05-02 | End: 1900-01-01

## 2025-06-13 ENCOUNTER — RX RENEWAL (OUTPATIENT)
Age: 65
End: 2025-06-13

## 2025-07-03 ENCOUNTER — APPOINTMENT (OUTPATIENT)
Dept: ORTHOPEDIC SURGERY | Facility: CLINIC | Age: 65
End: 2025-07-03
Payer: COMMERCIAL

## 2025-07-03 ENCOUNTER — NON-APPOINTMENT (OUTPATIENT)
Age: 65
End: 2025-07-03

## 2025-07-03 PROCEDURE — 99213 OFFICE O/P EST LOW 20 MIN: CPT

## 2025-07-03 RX ORDER — TIZANIDINE 4 MG/1
4 TABLET ORAL
Qty: 30 | Refills: 1 | Status: ACTIVE | COMMUNITY
Start: 2025-07-03 | End: 1900-01-01

## 2025-07-31 ENCOUNTER — APPOINTMENT (OUTPATIENT)
Dept: ORTHOPEDIC SURGERY | Facility: CLINIC | Age: 65
End: 2025-07-31
Payer: COMMERCIAL

## 2025-07-31 DIAGNOSIS — M25.512 PAIN IN LEFT SHOULDER: ICD-10-CM

## 2025-07-31 PROCEDURE — 99213 OFFICE O/P EST LOW 20 MIN: CPT
